# Patient Record
Sex: MALE | Race: WHITE | NOT HISPANIC OR LATINO | Employment: FULL TIME | URBAN - METROPOLITAN AREA
[De-identification: names, ages, dates, MRNs, and addresses within clinical notes are randomized per-mention and may not be internally consistent; named-entity substitution may affect disease eponyms.]

---

## 2021-06-28 ENCOUNTER — LAB (OUTPATIENT)
Dept: LAB | Age: 56
End: 2021-06-28
Payer: COMMERCIAL

## 2021-06-28 DIAGNOSIS — Z79.899 LONG-TERM USE OF HIGH-RISK MEDICATION: ICD-10-CM

## 2021-06-28 LAB
ATRIAL RATE: 50 BPM
P AXIS: 59 DEGREES
PR INTERVAL: 186 MS
QRS AXIS: -29 DEGREES
QRSD INTERVAL: 98 MS
QT INTERVAL: 476 MS
QTC INTERVAL: 433 MS
T WAVE AXIS: 45 DEGREES
VENTRICULAR RATE: 50 BPM

## 2021-06-28 PROCEDURE — 93010 ELECTROCARDIOGRAM REPORT: CPT | Performed by: INTERNAL MEDICINE

## 2021-06-28 PROCEDURE — 93005 ELECTROCARDIOGRAM TRACING: CPT

## 2022-08-11 ENCOUNTER — OFFICE VISIT (OUTPATIENT)
Dept: INTERNAL MEDICINE CLINIC | Facility: CLINIC | Age: 57
End: 2022-08-11
Payer: COMMERCIAL

## 2022-08-11 VITALS
DIASTOLIC BLOOD PRESSURE: 60 MMHG | BODY MASS INDEX: 39.93 KG/M2 | WEIGHT: 269.6 LBS | HEART RATE: 58 BPM | OXYGEN SATURATION: 98 % | HEIGHT: 69 IN | SYSTOLIC BLOOD PRESSURE: 110 MMHG | TEMPERATURE: 98.1 F

## 2022-08-11 DIAGNOSIS — M15.9 PRIMARY OSTEOARTHRITIS INVOLVING MULTIPLE JOINTS: ICD-10-CM

## 2022-08-11 DIAGNOSIS — S22.028D OTHER CLOSED FRACTURE OF SECOND THORACIC VERTEBRA WITH ROUTINE HEALING, SUBSEQUENT ENCOUNTER: ICD-10-CM

## 2022-08-11 DIAGNOSIS — F33.41 RECURRENT MAJOR DEPRESSIVE DISORDER, IN PARTIAL REMISSION (HCC): ICD-10-CM

## 2022-08-11 DIAGNOSIS — R60.0 LOWER EXTREMITY EDEMA: ICD-10-CM

## 2022-08-11 DIAGNOSIS — B18.2 CHRONIC HEPATITIS C WITHOUT HEPATIC COMA (HCC): ICD-10-CM

## 2022-08-11 DIAGNOSIS — J43.2 CENTRILOBULAR EMPHYSEMA (HCC): ICD-10-CM

## 2022-08-11 DIAGNOSIS — M50.30 DDD (DEGENERATIVE DISC DISEASE), CERVICAL: ICD-10-CM

## 2022-08-11 DIAGNOSIS — E55.9 VITAMIN D DEFICIENCY: ICD-10-CM

## 2022-08-11 DIAGNOSIS — Z72.0 TOBACCO USE: ICD-10-CM

## 2022-08-11 DIAGNOSIS — Z13.220 SCREENING CHOLESTEROL LEVEL: ICD-10-CM

## 2022-08-11 DIAGNOSIS — F19.11 HISTORY OF DRUG ABUSE (HCC): ICD-10-CM

## 2022-08-11 DIAGNOSIS — F11.90 OPIOID USE DISORDER: ICD-10-CM

## 2022-08-11 DIAGNOSIS — Z76.89 ESTABLISHING CARE WITH NEW DOCTOR, ENCOUNTER FOR: ICD-10-CM

## 2022-08-11 DIAGNOSIS — K21.00 GASTROESOPHAGEAL REFLUX DISEASE WITH ESOPHAGITIS WITHOUT HEMORRHAGE: ICD-10-CM

## 2022-08-11 DIAGNOSIS — Z12.11 ENCOUNTER FOR COLORECTAL CANCER SCREENING: Primary | ICD-10-CM

## 2022-08-11 DIAGNOSIS — D64.9 ANEMIA, UNSPECIFIED TYPE: ICD-10-CM

## 2022-08-11 DIAGNOSIS — Z12.12 ENCOUNTER FOR COLORECTAL CANCER SCREENING: Primary | ICD-10-CM

## 2022-08-11 DIAGNOSIS — Z96.642 HISTORY OF TOTAL LEFT HIP REPLACEMENT: ICD-10-CM

## 2022-08-11 PROBLEM — F33.9 RECURRENT MAJOR DEPRESSIVE DISORDER (HCC): Status: ACTIVE | Noted: 2022-08-11

## 2022-08-11 PROBLEM — J43.9 PULMONARY EMPHYSEMA (HCC): Status: ACTIVE | Noted: 2022-06-09

## 2022-08-11 PROBLEM — S22.029A CLOSED T2 FRACTURE (HCC): Status: ACTIVE | Noted: 2022-05-04

## 2022-08-11 PROCEDURE — 99204 OFFICE O/P NEW MOD 45 MIN: CPT | Performed by: FAMILY MEDICINE

## 2022-08-11 RX ORDER — ARIPIPRAZOLE 10 MG/1
10 TABLET ORAL DAILY
COMMUNITY
Start: 2022-08-08

## 2022-08-11 RX ORDER — OMEPRAZOLE 20 MG/1
20 CAPSULE, DELAYED RELEASE ORAL DAILY
Qty: 90 CAPSULE | Refills: 1 | Status: SHIPPED | OUTPATIENT
Start: 2022-08-11

## 2022-08-11 RX ORDER — ERGOCALCIFEROL 1.25 MG/1
50000 CAPSULE ORAL WEEKLY
Qty: 12 CAPSULE | Refills: 0 | Status: SHIPPED | OUTPATIENT
Start: 2022-08-11

## 2022-08-11 RX ORDER — OMEPRAZOLE 20 MG/1
20 CAPSULE, DELAYED RELEASE ORAL DAILY
COMMUNITY
Start: 2022-07-01 | End: 2022-08-11 | Stop reason: SDUPTHER

## 2022-08-11 RX ORDER — VENLAFAXINE HYDROCHLORIDE 75 MG/1
75 CAPSULE, EXTENDED RELEASE ORAL DAILY
COMMUNITY
Start: 2022-08-08

## 2022-08-11 NOTE — PROGRESS NOTES
Assessment/Plan:    1  Encounter for colorectal cancer screening  -     Ambulatory referral for colonoscopy; Future    2  Establishing care with new doctor, encounter for    3  DDD (degenerative disc disease), cervical    4  Other closed fracture of second thoracic vertebra with routine healing, subsequent encounter    5  Gastroesophageal reflux disease with esophagitis without hemorrhage  -     Comprehensive metabolic panel; Future  -     omeprazole (PriLOSEC) 20 mg delayed release capsule; Take 1 capsule (20 mg total) by mouth in the morning    6  Chronic hepatitis C without hepatic coma (HCC)    7  History of drug abuse ([place] [address] )    8  Opioid use disorder    9  Primary osteoarthritis involving multiple joints    10  Centrilobular emphysema ([place] [address] )    11  Tobacco use  -     CT lung screening program; Future; Expected date: 08/11/2022    12  History of total left hip replacement    13  Lower extremity edema  -     Echo complete w/ contrast if indicated; Future; Expected date: 08/11/2022  -     VAS lower limb venous duplex study, complete bilateral; Future; Expected date: 08/11/2022    14  Vitamin D deficiency  -     Vitamin D 25 hydroxy; Future  -     ergocalciferol (VITAMIN D2) 50,000 units; Take 1 capsule (50,000 Units total) by mouth once a week    15  Anemia, unspecified type  -     CBC and differential; Future  -     Iron Panel (Includes Ferritin, Iron Sat%, Iron, and TIBC); Future    16  Screening cholesterol level  -     Lipid Panel with Direct LDL reflex; Future    17  Recurrent major depressive disorder, in partial remission ([place] [address] )      BMI Counseling: Body mass index is 40 4 kg/m²  The BMI is above normal  Nutrition recommendations include moderation in carbohydrate intake  Exercise recommendations include exercising 3-5 times per week  No pharmacotherapy was ordered  Rationale for BMI follow-up plan is due to patient being overweight or obese       Depression Screening and Follow-up Plan: Patient's depression screening was positive with a PHQ-2 score of 6  Their PHQ-9 score was 24  several blood tests and other screening test ordered  I do not think he has a lower extremity DVT but we will check a Doppler  If everything is negative we will prescribe water pills  Patient states that he is interested in getting colonoscopy however I advised him to check with his surgeon at [facility] when he is cleared to have a colonoscopy since he just had major spine surgery  He is agreeable to this plan  Check lab work and schedule above-listed tests in a timely fashion  Discussed smoking cessation  Patient will need PFT and likely maintenance inhaler  There are no Patient Instructions on file for this visit  Return in about 4 months (around 12/11/2022) for Recheck  Subjective:      Patient ID: Angelique Warner is a 65 y o  male  Chief Complaint   Patient presents with   [address] [place] [address] Patient Visit     Establish care having leg pain and would like medication filled for acid reflux    Health Screening     Depression screen colonoscopy colonoscopy ordered       HPI     New patient here to our practice, transferred [facility]  Recent history of spine surgery at [facility] 2 months back  He follows with [facility] for his ortho care and has not been cleared to start physical therapy yet according to him  They manage his postop medications  He saw someone at [facility] on July 1st and was ordered some postop labs but he would like to transfer care to [facility]  Patient has hepatitis C that he never had treatment for and his last test reveals 0 viral copies  Patient has postop anemia but did not get his lab work done  He states he was on vitamin-D for vitamin-D deficiency several months back and finished approximately 6 weeks ago  Patient's current complaint is lower extremity edema ongoing for several months but feels that it slightly got worse 1 month ago and now is stabilizing    He has a history of smoking 40 pack years and is not interested in quitting  He also has a history of childhood asthma and working in a cement factory and inhaling dust   Patient states he is taking his medications from Psychiatry and they managed the Abilify and Effexor that he takes for the diagnosis of depression  He also states he is on methadone by the methadone clinic  Patient has a history of drug abuse  When asked what he did he says everything under the sun when I was little  He is accompanied today by his mother  He is asking for refill of his Prilosec which did help him  He has never had a colonoscopy and is due for it  The following portions of the patient's history were reviewed and updated as appropriate: allergies, current medications, past family history, past medical history, past social history, past surgical history and problem list     Review of Systems        Constitutional:  Denies fever or chills   Eyes:  Denies double , blurry vision or eye pain  HENT:  Denies nasal congestion or sore throat   Respiratory:  Denies cough or shortness of breath or wheezing  Cardiovascular:  Denies palpitations or chest pain  GI:  Denies abdominal pain, nausea, or vomiting, no loose stools, no reflux  Integument:  Denies rash , no open areas  Neurologic:  Denies headache or focal weakness, no dizziness  : no dysuria, or hematuria      Current Outpatient Medications   Medication Sig Dispense Refill    ARIPiprazole (ABILIFY) 10 mg tablet Take 10 mg by mouth in the morning      ergocalciferol (VITAMIN D2) 50,000 units Take 1 capsule (50,000 Units total) by mouth once a week 12 capsule 0    omeprazole (PriLOSEC) 20 mg delayed release capsule Take 1 capsule (20 mg total) by mouth in the morning 90 capsule 1    venlafaxine (EFFEXOR-XR) 75 mg 24 hr capsule Take 75 mg by mouth in the morning       No current facility-administered medications for this visit         Objective:    /60 (BP Location: Left arm, Patient Position: Sitting, Cuff Size: Large)   Pulse 58   Temp 98 1 °F (36 7 °C) (Temporal)   Ht 5' 8 5" (1 74 m)   Wt 122 kg (269 lb 9 6 oz)   SpO2 98%   BMI 40 40 kg/m²        Physical Exam       Constitutional:  Well developed, well nourished, no acute distress, non-toxic appearance   Eyes:  PERRL, conjunctiva normal , non icteric sclera  HENT:  Atraumatic, oropharynx moist  Neck-  supple   Respiratory:  CTA b/l, decreased breath sounds at bases bilaterally, no rales, no wheezing   Cardiovascular:  RRR, no murmurs, +1-2 LE edema b/l  GI:  Soft, nondistended, normal bowel sounds x 4, nontender, no organomegaly, no mass, no rebound, no guarding   Neurologic:  no focal deficits noted   Psychiatric:  Speech and behavior appropriate , AAO x 3  Gait is stable with a cane      Kevin Jeong DO

## 2022-10-27 ENCOUNTER — VBI (OUTPATIENT)
Dept: ADMINISTRATIVE | Facility: OTHER | Age: 57
End: 2022-10-27

## 2022-11-25 ENCOUNTER — TELEPHONE (OUTPATIENT)
Dept: INTERNAL MEDICINE CLINIC | Age: 57
End: 2022-11-25

## 2022-11-25 NOTE — TELEPHONE ENCOUNTER
Patient called stating he had back surgery earlier in the year and now received a form that needs to be completed in order for him to keep his life insurance policy  Patient called surgeon's office that did back surgery and they refuse to fill out form due to too much time passing since surgery  Patient stated he was going to send a message on [employer] with forms uploaded to see what needs to be completed on form  Unsure if forms can be completed by Dr Candance Kitchens or if an office appt should be made? Please advise, Thank you

## 2022-11-28 ENCOUNTER — OFFICE VISIT (OUTPATIENT)
Dept: INTERNAL MEDICINE CLINIC | Facility: OTHER | Age: 57
End: 2022-11-28

## 2022-11-28 VITALS
HEART RATE: 90 BPM | TEMPERATURE: 98.7 F | WEIGHT: 274.6 LBS | SYSTOLIC BLOOD PRESSURE: 140 MMHG | DIASTOLIC BLOOD PRESSURE: 88 MMHG | BODY MASS INDEX: 40.67 KG/M2 | HEIGHT: 69 IN | OXYGEN SATURATION: 97 %

## 2022-11-28 DIAGNOSIS — Z98.1 S/P CERVICAL SPINAL FUSION: ICD-10-CM

## 2022-11-28 DIAGNOSIS — Z12.11 ENCOUNTER FOR SCREENING FOR MALIGNANT NEOPLASM OF COLON: Primary | ICD-10-CM

## 2022-11-28 RX ORDER — METHOCARBAMOL 750 MG/1
750 TABLET, FILM COATED ORAL 3 TIMES DAILY PRN
COMMUNITY
Start: 2022-07-01

## 2022-11-28 NOTE — PROGRESS NOTES
Assessment/Plan:    1  Encounter for screening for malignant neoplasm of colon  -     Ambulatory referral for colonoscopy; Future    2  S/P cervical spinal fusion    We will await forms from his specialist to fully complete his current forms  He plans on returning back to their care for surveillance once or twice a year and has an appointment in December however there is a deadline and is forms need to be completed this week and sent in  There are no Patient Instructions on file for this visit  Return for Next scheduled follow up  Subjective:      Patient ID: Marcos Lynch is a 65 y o  male  Chief Complaint   Patient presents with   • Life Insurance Forms   • Hip Pain     Right hip pain & left knee pain  • Leg Swelling     Venous duplex done at [facility] 11/16/2022   •      [place] ordered, pt aware to get colo done       HPI  Patient status post spinal C-spine fusion as well as close T2 fracture in July 2022  Had his care done at [facility] and completed physical therapy  Currently is disabled through them at their request and original forms for due life insurance was filled out by them  He was not able to get an appointment in a timely fashion with them to have his 2nd forms completed and is here with us today  Old records are limited and I do not have his previous form scanned in his chart  He did not bring any copies to me today  Patient states that he is unable to stand or sit for long periods of time and is not able to walk for long periods of time  He has poor range of motion in his neck as well as dysfunction in his hands with hand   He also has some depression because of his physical state and he is compliant with his medications  Currently he is helping to take care of his significant other who had some hospitalizations and is recuperating and is having difficulty doing that  He is not allowed to lift more than 10 lb at a time      The following portions of the patient's history were reviewed and updated as appropriate: allergies, current medications, past family history, past medical history, past social history, past surgical history and problem list     Review of Systems      Constitutional:  Denies fever or chills   Eyes:  Denies double , blurry vision or eye pain  HENT:  Denies nasal congestion or sore throat   Respiratory:  Denies cough or shortness of breath or wheezing  Cardiovascular:  Denies palpitations or chest pain  GI:  Denies abdominal pain, nausea, or vomiting, no loose stools, no reflux  Integument:  Denies rash , no open areas  Neurologic:  Denies headache or focal weakness, no dizziness  : no dysuria, or hematuria      Current Outpatient Medications   Medication Sig Dispense Refill   • ARIPiprazole (ABILIFY) 10 mg tablet Take 10 mg by mouth in the morning     • ergocalciferol (VITAMIN D2) 50,000 units Take 1 capsule (50,000 Units total) by mouth once a week 12 capsule 0   • methocarbamol (ROBAXIN) 750 mg tablet Take 750 mg by mouth Three times daily as needed     • omeprazole (PriLOSEC) 20 mg delayed release capsule Take 1 capsule (20 mg total) by mouth in the morning 90 capsule 1   • venlafaxine (EFFEXOR-XR) 75 mg 24 hr capsule Take 75 mg by mouth in the morning       No current facility-administered medications for this visit         Objective:    /88 (BP Location: Left arm, Patient Position: Sitting, Cuff Size: Large)   Pulse 90   Temp 98 7 °F (37 1 °C) (Temporal)   Ht 5' 8 5" (1 74 m)   Wt 125 kg (274 lb 9 6 oz)   SpO2 97% Comment: ra  BMI 41 15 kg/m²        Physical Exam      Constitutional:  Well developed, well nourished, no acute distress, non-toxic appearance   Eyes:  PERRL, conjunctiva normal , non icteric sclera  HENT:  Atraumatic, oropharynx moist  Neck-  supple   Respiratory:  CTA b/l, normal breath sounds, no rales, no wheezing   Cardiovascular:  RRR, no murmurs, no LE edema b/l  GI:  Soft, nondistended, normal bowel sounds x 4, nontender, no organomegaly, no mass, no rebound, no guarding   Neurologic:  no focal deficits noted   Psychiatric:  Speech and behavior appropriate , AAO x 3  Musculoskeletal, healed surgical scar in C-spine and T-spine  Poor range of motion in neck and T-spine  Poor handgrip bilaterally right worse than left  Poor posture with his C-spine and chain in a more flexed position with slight kyphosis           Domenic Alexander, DO

## 2022-12-19 ENCOUNTER — HOSPITAL ENCOUNTER (OUTPATIENT)
Dept: RADIOLOGY | Facility: MEDICAL CENTER | Age: 57
Discharge: HOME | End: 2022-12-19

## 2022-12-19 DIAGNOSIS — Z72.0 TOBACCO USE: ICD-10-CM

## 2022-12-20 ENCOUNTER — HOSPITAL ENCOUNTER (OUTPATIENT)
Dept: NON INVASIVE DIAGNOSTICS | Facility: CLINIC | Age: 57
Discharge: HOME | End: 2022-12-20

## 2022-12-20 VITALS
SYSTOLIC BLOOD PRESSURE: 140 MMHG | BODY MASS INDEX: 41.52 KG/M2 | HEIGHT: 68 IN | DIASTOLIC BLOOD PRESSURE: 88 MMHG | WEIGHT: 274 LBS | HEART RATE: 90 BPM

## 2022-12-20 DIAGNOSIS — R60.0 LOWER EXTREMITY EDEMA: ICD-10-CM

## 2022-12-20 LAB
AORTIC ROOT: 3.5 CM
APICAL FOUR CHAMBER EJECTION FRACTION: 68 %
ASCENDING AORTA: 3.6 CM
E WAVE DECELERATION TIME: 224 MS
FRACTIONAL SHORTENING: 25 (ref 28–44)
INTERVENTRICULAR SEPTUM IN DIASTOLE (PARASTERNAL SHORT AXIS VIEW): 1.2 CM
INTERVENTRICULAR SEPTUM: 1.2 CM (ref 0.6–1.1)
IVC: 18 MM
LAAS-AP2: 26.3 CM2
LAAS-AP4: 28.1 CM2
LEFT ATRIUM SIZE: 4 CM
LEFT INTERNAL DIMENSION IN SYSTOLE: 3.8 CM (ref 2.1–4)
LEFT VENTRICULAR INTERNAL DIMENSION IN DIASTOLE: 5.1 CM (ref 3.5–6)
LEFT VENTRICULAR POSTERIOR WALL IN END DIASTOLE: 1.2 CM
LEFT VENTRICULAR STROKE VOLUME: 63 ML
LVSV (TEICH): 63 ML
MV E'TISSUE VEL-SEP: 10 CM/S
MV PEAK A VEL: 0.78 M/S
MV PEAK E VEL: 78 CM/S
MV STENOSIS PRESSURE HALF TIME: 65 MS
MV VALVE AREA P 1/2 METHOD: 3.38
RA PRESSURE ESTIMATED: 5 MMHG
RIGHT ATRIAL 2D VOLUME: 54 ML
RIGHT ATRIUM AREA SYSTOLE A4C: 20.2 CM2
RIGHT VENTRICLE ID DIMENSION: 4.8 CM
SL CV LEFT ATRIUM LENGTH A2C: 6.2 CM
SL CV LV EF: 65
SL CV PED ECHO LEFT VENTRICLE DIASTOLIC VOLUME (MOD BIPLANE) 2D: 123 ML
SL CV PED ECHO LEFT VENTRICLE SYSTOLIC VOLUME (MOD BIPLANE) 2D: 60 ML

## 2022-12-20 RX ADMIN — PERFLUTREN 0.4 ML/MIN: 6.52 INJECTION, SUSPENSION INTRAVENOUS at 12:38

## 2023-01-10 ENCOUNTER — CONSULT (OUTPATIENT)
Dept: GASTROENTEROLOGY | Facility: CLINIC | Age: 58
End: 2023-01-10

## 2023-01-10 VITALS
TEMPERATURE: 97.7 F | BODY MASS INDEX: 43.04 KG/M2 | HEIGHT: 68 IN | SYSTOLIC BLOOD PRESSURE: 130 MMHG | WEIGHT: 284 LBS | DIASTOLIC BLOOD PRESSURE: 74 MMHG

## 2023-01-10 DIAGNOSIS — R13.19 OTHER DYSPHAGIA: Primary | ICD-10-CM

## 2023-01-10 DIAGNOSIS — K21.00 GASTROESOPHAGEAL REFLUX DISEASE WITH ESOPHAGITIS WITHOUT HEMORRHAGE: ICD-10-CM

## 2023-01-10 DIAGNOSIS — Z12.11 ENCOUNTER FOR SCREENING FOR MALIGNANT NEOPLASM OF COLON: ICD-10-CM

## 2023-01-10 RX ORDER — OMEPRAZOLE 20 MG/1
20 CAPSULE, DELAYED RELEASE ORAL DAILY
Qty: 90 CAPSULE | Refills: 1 | Status: SHIPPED | OUTPATIENT
Start: 2023-01-10

## 2023-01-10 NOTE — PATIENT INSTRUCTIONS
Scheduled date of colonoscopy/eggd  (as of today):  3/29/23  Physician performing colonoscopy: Dr Wendi Montenegro  Location of colonoscopy: [facility]  Bowel prep reviewed with patient: [email or web address]  Instructions reviewed with patient by: Toni Rodriguez   Clearances:   n/a

## 2023-01-10 NOTE — PROGRESS NOTES
[facility] - Outpatient Consultation  Laura Galaviz 65 y o  male MRN: [id number]  Encounter: [id number]    Assessment and Plan    1  GERD  2  Dysphagia   The patient often experiences epigastric discomfort and burning consistent with reflux  This improves with omeprazole and baking soda  He states if he is not taking the omeprazole his symptoms return  He has undergone upper endoscopy many years ago and was told that he had a "flap" in that area  He states that sometimes he will notice that food gets stuck in his epigastric region  He also states that food gets stuck in his supraclavicular notch however he was told that this was secondary to his spinal disease   -Continue omeprazole 20 mg once daily  -Recommend further evaluation with EGD    3  Colon cancer screening   The patient has yet to undergo colorectal cancer screening  He denies any abnormal bowel habits or family history of colon cancer   -Discussed colonoscopy in detail including the risks of bleeding, infection, bowel perforation, and missed polyp    Follow-up after EGD and colonoscopy    ______________________________________________________________________    History of Present Illness  Laura Galaviz is a 65 y o  male with thoracic spine fracture s/p spinal fusion, DDD, and pulmonary emphysema here for consultation of acid reflux and colon cancer screening  The patient states that currently he has epigastric burning and discomfort consistent with reflux  When he takes omeprazole 20 mg and it resolves his symptoms  If he stops taking the medication his symptoms return  In addition to this he does also admit to dysphagia  He feels as though food gets stuck in his supraclavicular notch as well as his epigastric region  He states that he has had significant spinal issues and has been told this is causes the food to get struck in the supraclavicular area  As for colon cancer screening he is yet to undergo this  At this time he denies any abnormalities with his bowels  No family history of colon cancer to his knowledge  Review of Systems   Constitutional: Negative for activity change, appetite change, chills, fatigue, fever and unexpected weight change  HENT: Positive for trouble swallowing  Gastrointestinal: Negative for abdominal distention, abdominal pain, anal bleeding, blood in stool, constipation, diarrhea, nausea, rectal pain and vomiting  Musculoskeletal: Positive for back pain and gait problem  Psychiatric/Behavioral: Negative for confusion         Past Medical History  Past Medical History:   Diagnosis Date   • Anxiety    • Arthritis    • Cancer ([place] [address] ) 2017    skin cancer   • COPD (chronic obstructive pulmonary disease) ([place] [address] )    • Depression    • Diverticulitis of colon    • GERD (gastroesophageal reflux disease)    • Infectious viral hepatitis    • Pneumonia    • Shingles        Past Social history  Past Surgical History:   Procedure Laterality Date   • APPENDECTOMY     • HIP SURGERY Left 2015   • KNEE SURGERY Left 2020    orthoscopic   • SPINE SURGERY  06/13/2022     Social History     Socioeconomic History   • Marital status: Single     Spouse name: Not on file   • Number of children: Not on file   • Years of education: Not on file   • Highest education level: Not on file   Occupational History   • Not on file   Tobacco Use   • Smoking status: Every Day     Packs/day: 1 00     Years: 40 00     Pack years: 40 00     Types: Cigarettes   • Smokeless tobacco: Former     Types: Chew   Vaping Use   • Vaping Use: Never used   Substance and Sexual Activity   • Alcohol use: Yes     Comment: rare   • Drug use: Not Currently   • Sexual activity: Not Currently   Other Topics Concern   • Not on file   Social History Narrative   • Not on file     Social Determinants of Health     Financial Resource Strain: Not on file   Food Insecurity: Not on file   Transportation Needs: Not on file   Physical Activity: Not on file   Stress: Not on file   Social Connections: Not on file   Intimate Partner Violence: Not on file   Housing Stability: Not on file     Social History     Substance and Sexual Activity   Alcohol Use Yes    Comment: rare     Social History     Substance and Sexual Activity   Drug Use Not Currently     Social History     Tobacco Use   Smoking Status Every Day   • Packs/day: 1 00   • Years: 40 00   • Pack years: 40 00   • Types: Cigarettes   Smokeless Tobacco Former   • Types: Chew       Past Family History  Family History   Problem Relation Age of Onset   • Cancer Mother    • Depression Mother    • Hyperlipidemia Mother    • Thyroid disease Mother    • Cancer Father    • COPD Father    • Hypertension Father    • Post-traumatic stress disorder Father    • Diabetes Sister    • Hepatitis Sister    • Cancer Sister    • Hepatitis Sister    • Mental illness Sister        Current Medications  Current Outpatient Medications   Medication Sig Dispense Refill   • ARIPiprazole (ABILIFY) 10 mg tablet Take 10 mg by mouth in the morning     • bisacodyl (DULCOLAX) 5 mg EC tablet Take 1 tablet (5 mg total) by mouth once for 1 dose 2 tablet 0   • ergocalciferol (VITAMIN D2) 50,000 units Take 1 capsule (50,000 Units total) by mouth once a week 12 capsule 0   • methocarbamol (ROBAXIN) 750 mg tablet Take 750 mg by mouth Three times daily as needed     • omeprazole (PriLOSEC) 20 mg delayed release capsule Take 1 capsule (20 mg total) by mouth in the morning 90 capsule 1   • polyethylene glycol (GOLYTELY) 4000 mL solution Take 4,000 mL by mouth once for 1 dose 4000 mL 0   • venlafaxine (EFFEXOR-XR) 75 mg 24 hr capsule Take 75 mg by mouth in the morning       No current facility-administered medications for this visit         Allergies  Allergies   Allergen Reactions   • Penicillins Anaphylaxis, Rash and Throat Swelling         The following portions of the patient's history were reviewed and updated as appropriate: allergies, current medications, past medical history, past social history, past surgical history and problem list       Vitals  Vitals:    01/10/23 1113   BP: 130/74   BP Location: Left arm   Patient Position: Sitting   Cuff Size: Adult   Temp: 97 7 °F (36 5 °C)   TempSrc: Tympanic   Weight: 129 kg (284 lb)   Height: 5' 8" (1 727 m)         Physical Exam  Constitutional   General appearance: Patient is seated and in no acute distress, well appearing and well nourished  Head and Face   Head and face: Normal     Eyes   Conjunctiva and lids: No erythema, swelling or discharge  Anicteric  Ears, Nose, Mouth, and Throat   Hearing: Normal     Neck: Supple, trachea midline  Pulmonary   Respiratory effort: No increased work of breathing or signs of respiratory distress  Lungs: Clear to ascultation, no wheezes, rhonchi, or rales  Cardiovascular   Heart: Regular rate and rhythm, no murmurs gallops or rubs   Examination of extremities for edema and/or varicosities: Normal     Musculoskeletal   Gait and station: Normal     Skin   Skin and subcutaneous tissue: Warm, dry, and intact  No visible jaundice, lesions or rashes  Psychiatric   Judgment and insight: Normal  Recent and remote memory:  Normal  Mood and affect: Normal      Results  No visits with results within 1 Day(s) from this visit     Latest known visit with results is:   Hospital Outpatient Visit on 12/20/2022   Component Date Value   • A4C EF 12/20/2022 68    • LVIDd 12/20/2022 5 10    • LVIDS 12/20/2022 3 80    • IVSd 12/20/2022 1 20    • LVPWd 12/20/2022 1 20    • FS 12/20/2022 25    • MV E' Tissue Velocity Se* 12/20/2022 10    • E wave deceleration time 12/20/2022 224    • MV Peak E Boyd 12/20/2022 78    • MV Peak A Boyd 12/20/2022 0 78    • RVID d 12/20/2022 4 8    • LA size 12/20/2022 4    • LA length (A2C) 12/20/2022 6 20    • RA 2D Volume 12/20/2022 54 0    • RAA A4C 12/20/2022 20 2    • MV stenosis pressure 1/2* 12/20/2022 65    • MV valve area p 1/2 meth* 12/20/2022 3 38 • Ao root 12/20/2022 3 50    • Asc Ao 12/20/2022 3 6    • Left ventricular stroke * 12/20/2022 63 00    • IVS 12/20/2022 1 2    • LEFT VENTRICLE SYSTOLIC * 20/49/5188 60    • LV DIASTOLIC VOLUME (MOD* 30/34/0310 123    • Left Atrium Area-systoli* 12/20/2022 28 1    • Left Atrium Area-systoli* 12/20/2022 26 3    • LVSV, 2D 12/20/2022 63    • LV EF 12/20/2022 65    • Est  RA pres 12/20/2022 5 0    • IVC 12/20/2022 18 0        Radiology Results  CT lung screening program    Result Date: 12/24/2022  Narrative: CT CHEST LUNG CANCER SCREENING WITHOUT IV CONTRAST INDICATION:   Z72 0: Tobacco use  40 pack year smoking history  COMPARISON: CXR 9/9/2013  Abdomen CT 1/13/2013  TECHNIQUE: CT of the chest without intravenous contrast   Axial, sagittal, coronal 2D reformats and coronal MIPS created from source data  Radiation dose length product (DLP):  376 mGy-cm   Radiation dose exposure minimized using iterative reconstruction and automated exposure control  FINDINGS: LUNGS:  No suspicious nodules  Benign intrapulmonary lymph nodes abutting the minor fissure (3/67, 71)  Multiple benign juxtapleural nodules in the lateral basal left lower lobe, marked on series 3, stable since the abdominal CT from 2013  Benign bilateral linear atelectasis/scar in the mid and lower lungs  Benign calcified granulomas  AIRWAYS: No significant filling defects  PLEURA:  Unremarkable  HEART/GREAT VESSELS:  Normal for age  MEDIASTINUM AND OTF:  Unremarkable  CHEST WALL AND LOWER NECK: Unremarkable  UPPER ABDOMEN:  Diffuse hepatic steatosis  OSSEOUS STRUCTURES: Moderate degenerative disease in the spine  Extensive cervicothoracic fusion  Impression: No suspicious nodules  Lung-RADS1, negative  Continue annual screening with LDCT in 12 months  Diffuse hepatic steatosis    Workstation performed: [id number]     Echo complete w/ contrast if indicated    Result Date: 12/20/2022  Narrative: •  Left Ventricle: Left ventricular cavity size is normal  Wall thickness is mildly increased  There is concentric remodeling  The left ventricular ejection fraction is 65%  Systolic function is normal  Wall motion is normal  Diastolic function is normal  •  Right Ventricle: Right ventricular cavity size is normal  Systolic function is normal  •  Left Atrium: The atrium is mildly dilated (35-41 mL/m2)  •  Right Atrium: The atrium is mildly dilated  Orders  Orders Placed This Encounter   Procedures   • Colonoscopy     Standing Status:   Future     Standing Expiration Date:   1/10/2024     Order Specific Question:   Requested Site     Answer:   [facility]     Order Specific Question:   Performing Location     Answer:   Endoscopy Dept     Order Specific Question:   Anesthesia required? Answer: Yes   • EGD     Standing Status:   Future     Standing Expiration Date:   1/10/2024     Order Specific Question:   Requested Site     Answer:   [facility]     Order Specific Question:   Performing Location     Answer:   Endoscopy Dept     Order Specific Question:   Anesthesia required? Answer:    Yes

## 2023-01-16 ENCOUNTER — OFFICE VISIT (OUTPATIENT)
Dept: INTERNAL MEDICINE CLINIC | Facility: CLINIC | Age: 58
End: 2023-01-16

## 2023-01-16 VITALS
TEMPERATURE: 99.1 F | HEART RATE: 82 BPM | WEIGHT: 282 LBS | DIASTOLIC BLOOD PRESSURE: 80 MMHG | SYSTOLIC BLOOD PRESSURE: 140 MMHG | OXYGEN SATURATION: 96 % | HEIGHT: 67 IN | BODY MASS INDEX: 44.26 KG/M2

## 2023-01-16 DIAGNOSIS — E55.9 VITAMIN D DEFICIENCY: Primary | ICD-10-CM

## 2023-01-16 DIAGNOSIS — M15.9 PRIMARY OSTEOARTHRITIS INVOLVING MULTIPLE JOINTS: ICD-10-CM

## 2023-01-16 DIAGNOSIS — K76.0 FATTY LIVER: ICD-10-CM

## 2023-01-16 DIAGNOSIS — Z12.5 SCREENING PSA (PROSTATE SPECIFIC ANTIGEN): ICD-10-CM

## 2023-01-16 DIAGNOSIS — R60.0 LOWER EXTREMITY EDEMA: ICD-10-CM

## 2023-01-16 DIAGNOSIS — K21.00 GASTROESOPHAGEAL REFLUX DISEASE WITH ESOPHAGITIS WITHOUT HEMORRHAGE: ICD-10-CM

## 2023-01-16 DIAGNOSIS — Z72.0 TOBACCO USE: ICD-10-CM

## 2023-01-16 DIAGNOSIS — J43.2 CENTRILOBULAR EMPHYSEMA (HCC): ICD-10-CM

## 2023-01-16 DIAGNOSIS — E78.1 HYPERTRIGLYCERIDEMIA: ICD-10-CM

## 2023-01-16 RX ORDER — ERGOCALCIFEROL 1.25 MG/1
50000 CAPSULE ORAL WEEKLY
Qty: 12 CAPSULE | Refills: 1 | Status: SHIPPED | OUTPATIENT
Start: 2023-01-16 | End: 2023-01-16 | Stop reason: SDUPTHER

## 2023-01-16 RX ORDER — OMEGA-3-ACID ETHYL ESTERS 1 G/1
2 CAPSULE, LIQUID FILLED ORAL 2 TIMES DAILY
Qty: 60 CAPSULE | Refills: 5 | Status: SHIPPED | OUTPATIENT
Start: 2023-01-16

## 2023-01-16 RX ORDER — ERGOCALCIFEROL 1.25 MG/1
50000 CAPSULE ORAL WEEKLY
Qty: 12 CAPSULE | Refills: 1 | Status: SHIPPED | OUTPATIENT
Start: 2023-01-16

## 2023-01-16 RX ORDER — FUROSEMIDE 20 MG/1
20 TABLET ORAL DAILY
Qty: 30 TABLET | Refills: 5 | Status: SHIPPED | OUTPATIENT
Start: 2023-01-16

## 2023-01-16 NOTE — PROGRESS NOTES
Assessment/Plan:    1  Vitamin D deficiency  -     ergocalciferol (VITAMIN D2) 50,000 units; Take 1 capsule (50,000 Units total) by mouth once a week  -     Vitamin D 25 hydroxy; Future    2  Lower extremity edema  -     furosemide (LASIX) 20 mg tablet; Take 1 tablet (20 mg total) by mouth daily  -     TSH, 3rd generation with Free T4 reflex; Future    3  Centrilobular emphysema ([address] )    4  Tobacco use    5  Fatty liver    6  Gastroesophageal reflux disease with esophagitis without hemorrhage  -     TSH, 3rd generation with Free T4 reflex; Future    7  Primary osteoarthritis involving multiple joints  -     XR knee 3 vw right non injury; Future; Expected date: 01/16/2023  -     XR knee 3 vw left non injury; Future; Expected date: 01/16/2023    8  Hypertriglyceridemia  -     Lipid Panel with Direct LDL reflex; Future; Expected date: 07/16/2023  -     Comprehensive metabolic panel; Future  -     CBC and differential; Future  -     omega-3-acid ethyl esters (LOVAZA) 1 g capsule; Take 2 capsules (2 g total) by mouth 2 (two) times a day    9  Screening PSA (prostate specific antigen)  -     PSA, Total Screen; Future    Discussed better blood pressure control check blood pressure at home and report back  Continue with vitamin supplementation  Repeat blood work 6 months  Start daily Lasix  Advised to make appointment with orthopedics        There are no Patient Instructions on file for this visit  Return in about 6 months (around 7/16/2023) for Recheck  Subjective:      Patient ID: Rafaela Fuchs is a 65 y o  male  Chief Complaint   Patient presents with   • Follow-up     4/ m f/u visit, review labs from 11/30/22, Echo and CT Lung  He is scheduled for a colonoscopy with [facility] on 3/29/23  Declines a flu vaccine  HPI    Lower extremity edema, has bad knees and saw a rheumatology who took off some fluid and placed steroid in there    It did originally feel better but he feels the fluid reaccumulated  Has some concentric remodeling on echocardiogram with ejection fraction 65%,    Significant C-spine surgery with healed surgical scar, has an appointment with his specialist for disability  BMI 44, patient eating significant carbohydrates and not eating much protein      Depression, on Effexor by his psychiatrist   We do not manage this      The following portions of the patient's history were reviewed and updated as appropriate: allergies, current medications, past family history, past medical history, past social history, past surgical history and problem list     Review of Systems      Constitutional:  Denies fever or chills , weight gain  Eyes:  Denies double , blurry vision or eye pain  HENT:  Denies nasal congestion or sore throat   Respiratory:  Denies cough or shortness of breath or wheezing  Cardiovascular:  Denies palpitations or chest pain  GI:  Denies abdominal pain, nausea, or vomiting, no loose stools, no reflux  Integument:  Denies rash , no open areas  Neurologic:  Denies headache or focal weakness, no dizziness  : no dysuria, or hematuria        Current Outpatient Medications   Medication Sig Dispense Refill   • ARIPiprazole (ABILIFY) 10 mg tablet Take 10 mg by mouth in the morning     • ergocalciferol (VITAMIN D2) 50,000 units Take 1 capsule (50,000 Units total) by mouth once a week 12 capsule 1   • furosemide (LASIX) 20 mg tablet Take 1 tablet (20 mg total) by mouth daily 30 tablet 5   • methocarbamol (ROBAXIN) 750 mg tablet Take 750 mg by mouth Three times daily as needed     • omega-3-acid ethyl esters (LOVAZA) 1 g capsule Take 2 capsules (2 g total) by mouth 2 (two) times a day 60 capsule 5   • omeprazole (PriLOSEC) 20 mg delayed release capsule Take 1 capsule (20 mg total) by mouth in the morning 90 capsule 1   • venlafaxine (EFFEXOR-XR) 75 mg 24 hr capsule Take 75 mg by mouth in the morning     • bisacodyl (DULCOLAX) 5 mg EC tablet Take 1 tablet (5 mg total) by mouth once for 1 dose (Patient not taking: Reported on 1/16/2023) 2 tablet 0   • polyethylene glycol (GOLYTELY) 4000 mL solution Take 4,000 mL by mouth once for 1 dose (Patient not taking: Reported on 1/16/2023) 4000 mL 0     No current facility-administered medications for this visit  Objective:    /80 (BP Location: Left arm, Patient Position: Sitting, Cuff Size: Large)   Pulse 82   Temp 99 1 °F (37 3 °C) (Tympanic)   Ht 5' 7" (1 702 m)   Wt 128 kg (282 lb)   SpO2 96%   BMI 44 17 kg/m²        Physical Exam       Constitutional:  Well developed, well nourished, no acute distress, non-toxic appearance   Eyes:  PERRL, conjunctiva normal , non icteric sclera  HENT:  Atraumatic, oropharynx moist  Neck-  supple   Respiratory:  CTA b/l, normal breath sounds, no rales, no wheezing   Cardiovascular:  RRR, no murmurs, trace to +1 LE edema b/l  GI:  Soft, nondistended, normal bowel sounds x 4, nontender, no organomegaly, no mass, no rebound, no guarding   Neurologic:  no focal deficits noted   Psychiatric:  Speech and behavior appropriate , AAO x 3  Musculoskeletal bilateral legs are slightly bowed    Generalized edema at trace to +1  Walks with a cane and is stable    Jorge Lindsey, DO

## 2023-02-17 ENCOUNTER — VBI (OUTPATIENT)
Dept: ADMINISTRATIVE | Facility: OTHER | Age: 58
End: 2023-02-17

## 2023-03-08 ENCOUNTER — TELEPHONE (OUTPATIENT)
Dept: GASTROENTEROLOGY | Facility: CLINIC | Age: 58
End: 2023-03-08

## 2023-03-29 ENCOUNTER — HOSPITAL ENCOUNTER (OUTPATIENT)
Dept: GASTROENTEROLOGY | Facility: HOSPITAL | Age: 58
Setting detail: OUTPATIENT SURGERY
Discharge: HOME | End: 2023-03-29
Attending: INTERNAL MEDICINE

## 2023-03-29 ENCOUNTER — ANESTHESIA EVENT (OUTPATIENT)
Dept: GASTROENTEROLOGY | Facility: HOSPITAL | Age: 58
End: 2023-03-29

## 2023-03-29 ENCOUNTER — ANESTHESIA (OUTPATIENT)
Dept: GASTROENTEROLOGY | Facility: HOSPITAL | Age: 58
End: 2023-03-29

## 2023-03-29 VITALS
SYSTOLIC BLOOD PRESSURE: 99 MMHG | BODY MASS INDEX: 44.26 KG/M2 | TEMPERATURE: 97.5 F | HEART RATE: 73 BPM | DIASTOLIC BLOOD PRESSURE: 47 MMHG | HEIGHT: 67 IN | OXYGEN SATURATION: 98 % | WEIGHT: 282 LBS | RESPIRATION RATE: 16 BRPM

## 2023-03-29 DIAGNOSIS — R13.19 OTHER DYSPHAGIA: ICD-10-CM

## 2023-03-29 DIAGNOSIS — Z12.11 ENCOUNTER FOR SCREENING FOR MALIGNANT NEOPLASM OF COLON: ICD-10-CM

## 2023-03-29 RX ORDER — SODIUM CHLORIDE, SODIUM LACTATE, POTASSIUM CHLORIDE, CALCIUM CHLORIDE 600; 310; 30; 20 MG/100ML; MG/100ML; MG/100ML; MG/100ML
INJECTION, SOLUTION INTRAVENOUS CONTINUOUS PRN
Status: DISCONTINUED | OUTPATIENT
Start: 2023-03-29 | End: 2023-03-29

## 2023-03-29 RX ORDER — EPHEDRINE SULFATE 50 MG/ML
INJECTION INTRAVENOUS AS NEEDED
Status: DISCONTINUED | OUTPATIENT
Start: 2023-03-29 | End: 2023-03-29

## 2023-03-29 RX ORDER — PROPOFOL 10 MG/ML
INJECTION, EMULSION INTRAVENOUS CONTINUOUS PRN
Status: DISCONTINUED | OUTPATIENT
Start: 2023-03-29 | End: 2023-03-29

## 2023-03-29 RX ORDER — LIDOCAINE HYDROCHLORIDE 20 MG/ML
INJECTION, SOLUTION EPIDURAL; INFILTRATION; INTRACAUDAL; PERINEURAL AS NEEDED
Status: DISCONTINUED | OUTPATIENT
Start: 2023-03-29 | End: 2023-03-29

## 2023-03-29 RX ORDER — PROPOFOL 10 MG/ML
INJECTION, EMULSION INTRAVENOUS AS NEEDED
Status: DISCONTINUED | OUTPATIENT
Start: 2023-03-29 | End: 2023-03-29

## 2023-03-29 RX ORDER — SODIUM CHLORIDE 9 MG/ML
INJECTION, SOLUTION INTRAVENOUS CONTINUOUS PRN
Status: DISCONTINUED | OUTPATIENT
Start: 2023-03-29 | End: 2023-03-29

## 2023-03-29 RX ORDER — METHADONE HYDROCHLORIDE 10 MG/5ML
150 SOLUTION ORAL DAILY
COMMUNITY

## 2023-03-29 RX ADMIN — LIDOCAINE HYDROCHLORIDE 100 MG: 20 INJECTION, SOLUTION EPIDURAL; INFILTRATION; INTRACAUDAL; PERINEURAL at 11:36

## 2023-03-29 RX ADMIN — EPHEDRINE SULFATE 10 MG: 50 INJECTION INTRAVENOUS at 12:10

## 2023-03-29 RX ADMIN — SODIUM CHLORIDE: 0.9 INJECTION, SOLUTION INTRAVENOUS at 12:16

## 2023-03-29 RX ADMIN — PROPOFOL 50 MG: 10 INJECTION, EMULSION INTRAVENOUS at 11:50

## 2023-03-29 RX ADMIN — PROPOFOL 100 MG: 10 INJECTION, EMULSION INTRAVENOUS at 11:36

## 2023-03-29 RX ADMIN — PROPOFOL 50 MG: 10 INJECTION, EMULSION INTRAVENOUS at 11:45

## 2023-03-29 RX ADMIN — PROPOFOL 130 MCG/KG/MIN: 10 INJECTION, EMULSION INTRAVENOUS at 12:00

## 2023-03-29 RX ADMIN — EPHEDRINE SULFATE 10 MG: 50 INJECTION INTRAVENOUS at 12:06

## 2023-03-29 RX ADMIN — PROPOFOL 50 MG: 10 INJECTION, EMULSION INTRAVENOUS at 11:38

## 2023-03-29 RX ADMIN — PROPOFOL 50 MG: 10 INJECTION, EMULSION INTRAVENOUS at 11:42

## 2023-03-29 RX ADMIN — SODIUM CHLORIDE: 0.9 INJECTION, SOLUTION INTRAVENOUS at 11:33

## 2023-03-29 NOTE — H&P
History and Physical - [facility]  Dequan Mercedes 65 y o  male MRN: [id number]                  HPI: Dequan Mercedes is a 65y o  year old male who presents for dysphagia and colon cancer screening      REVIEW OF SYSTEMS: Per the HPI, and otherwise unremarkable      Historical Information   Past Medical History:   Diagnosis Date   • Anxiety    • Arthritis    • Cancer ([facility] ) 2017    skin cancer   • COPD (chronic obstructive pulmonary disease) ([facility] )    • Depression    • Diverticulitis of colon    • GERD (gastroesophageal reflux disease)    • Infectious viral hepatitis    • Pneumonia    • Shingles      Past Surgical History:   Procedure Laterality Date   • APPENDECTOMY     • HIP SURGERY Left 2015   • KNEE SURGERY Left 2020    orthoscopic   • SPINE SURGERY  06/13/2022     Social History   Social History     Substance and Sexual Activity   Alcohol Use Yes    Comment: only on Holidays     Social History     Substance and Sexual Activity   Drug Use Not Currently     Social History     Tobacco Use   Smoking Status Every Day   • Packs/day: 0 50   • Years: 40 00   • Pack years: 20 00   • Types: Cigarettes, Pipe   • Start date: 1978   Smokeless Tobacco Former   • Types: Chew     Family History   Problem Relation Age of Onset   • Cancer Mother    • Depression Mother    • Hyperlipidemia Mother    • Thyroid disease Mother    • Cancer Father    • COPD Father    • Hypertension Father    • Post-traumatic stress disorder Father    • Diabetes Sister    • Hepatitis Sister    • Cancer Sister    • Hepatitis Sister    • Mental illness Sister        Meds/Allergies       Current Outpatient Medications:   •  ARIPiprazole (ABILIFY) 10 mg tablet  •  bisacodyl (DULCOLAX) 5 mg EC tablet  •  ergocalciferol (VITAMIN D2) 50,000 units  •  furosemide (LASIX) 20 mg tablet  •  methocarbamol (ROBAXIN) 750 mg tablet  •  omega-3-acid ethyl esters (LOVAZA) 1 g capsule  •  omeprazole (PriLOSEC) 20 mg delayed release capsule  • polyethylene glycol (GOLYTELY) 4000 mL solution  •  venlafaxine (EFFEXOR-XR) 75 mg 24 hr capsule    Allergies   Allergen Reactions   • Penicillins Anaphylaxis, Rash and Throat Swelling       Objective     There were no vitals taken for this visit  PHYSICAL EXAM    Gen: NAD  Head: NCAT  CV: RRR  CHEST: Clear  ABD: soft, NT/ND  EXT: no edema      ASSESSMENT/PLAN:  This is a 65y o  year old male here for EGD and colonoscopy, and he is stable and optimized for his procedure

## 2023-03-29 NOTE — ANESTHESIA PREPROCEDURE EVALUATION
Procedure:  COLONOSCOPY  EGD    Smoker, smoked today  COPD - no inhaler use, used it last week     Methadone - took it today    Relevant Problems   GI/HEPATIC   (+) Fatty liver   (+) Gastroesophageal reflux disease with esophagitis   (+) Hepatitis C virus infection without hepatic coma      MUSCULOSKELETAL   (+) DDD (degenerative disc disease), cervical   (+) Osteoarthritis      NEURO/PSYCH   (+) History of drug abuse (HCC)   (+) Recurrent major depressive disorder (HCC)      PULMONARY   (+) Pulmonary emphysema ([address] )      Echo 12/2022  Left Ventricle Left ventricular cavity size is normal  Wall thickness is mildly increased  There is concentric remodeling  The left ventricular ejection fraction is 65%  Systolic function is normal   Wall motion is normal  Diastolic function is normal    Right Ventricle Right ventricular cavity size is normal  Systolic function is normal    Left Atrium The atrium is mildly dilated (35-41 mL/m2)  Right Atrium The atrium is mildly dilated  Aortic Valve The aortic valve is trileaflet  The leaflets are not thickened  The leaflets are not calcified  The leaflets exhibit normal mobility  There is no evidence of regurgitation  The aortic valve has no significant stenosis  Mitral Valve Mitral valve structure is normal  There is trace regurgitation  There is no evidence of stenosis  Tricuspid Valve Tricuspid valve structure is normal  There is trace regurgitation  There is no evidence of stenosis  There is no indirect evidence of pulmonary hypertension  Pulmonic Valve There is trace regurgitation  There is no evidence of stenosis  Ascending Aorta The aortic root is normal in size  IVC/SVC The inferior vena cava size is 18 0 mm  The right atrial pressure is estimated at 5 0 mmHg  The inferior vena cava is normal in size  Respirophasic changes were normal    Pericardium There is no pericardial effusion  The pericardium is normal in appearance           Physical Exam    Airway    Mallampati score: II  TM Distance: >3 FB  Neck ROM: full     Dental   upper dentures and lower dentures,     Cardiovascular  Rhythm: regular, Rate: normal,     Pulmonary  Breath sounds clear to auscultation,     Other Findings        Anesthesia Plan  ASA Score- 3     Anesthesia Type- IV sedation with anesthesia with ASA Monitors  Additional Monitors:   Airway Plan:           Plan Factors-Exercise tolerance (METS): >4 METS  Chart reviewed  Existing labs reviewed  Patient summary reviewed  Patient is a current smoker  Patient smoked on day of surgery  Obstructive sleep apnea risk education given perioperatively  Induction- intravenous  Postoperative Plan-     Informed Consent- Anesthetic plan and risks discussed with patient  I personally reviewed this patient with the CRNA  Discussed and agreed on the Anesthesia Plan with the CRNA  [place]

## 2023-03-29 NOTE — ANESTHESIA POSTPROCEDURE EVALUATION
Post-Op Assessment Note    CV Status:  Stable    Pain management: adequate     Mental Status:  Alert and awake   Hydration Status:  Euvolemic   PONV Controlled:  Controlled   Airway Patency:  Patent      Post Op Vitals Reviewed: Yes      Staff: CRNA         No notable events documented      BP   113/72   Temp   97 2   Pulse  57   Resp   16   SpO2   99%

## 2023-05-09 ENCOUNTER — VBI (OUTPATIENT)
Dept: ADMINISTRATIVE | Facility: OTHER | Age: 58
End: 2023-05-09

## 2023-07-17 ENCOUNTER — OFFICE VISIT (OUTPATIENT)
Dept: INTERNAL MEDICINE CLINIC | Facility: OTHER | Age: 58
End: 2023-07-17
Payer: COMMERCIAL

## 2023-07-17 VITALS
HEART RATE: 83 BPM | WEIGHT: 283.4 LBS | RESPIRATION RATE: 18 BRPM | BODY MASS INDEX: 44.48 KG/M2 | HEIGHT: 67 IN | OXYGEN SATURATION: 96 % | TEMPERATURE: 98.7 F | SYSTOLIC BLOOD PRESSURE: 148 MMHG | DIASTOLIC BLOOD PRESSURE: 82 MMHG

## 2023-07-17 DIAGNOSIS — J43.2 CENTRILOBULAR EMPHYSEMA (HCC): ICD-10-CM

## 2023-07-17 DIAGNOSIS — K21.00 GASTROESOPHAGEAL REFLUX DISEASE WITH ESOPHAGITIS WITHOUT HEMORRHAGE: ICD-10-CM

## 2023-07-17 DIAGNOSIS — R60.0 LOWER EXTREMITY EDEMA: ICD-10-CM

## 2023-07-17 DIAGNOSIS — S22.028S: ICD-10-CM

## 2023-07-17 DIAGNOSIS — Z12.2 SCREENING FOR LUNG CANCER: Primary | ICD-10-CM

## 2023-07-17 DIAGNOSIS — B35.1 ONYCHOMYCOSIS: ICD-10-CM

## 2023-07-17 DIAGNOSIS — M15.9 PRIMARY OSTEOARTHRITIS INVOLVING MULTIPLE JOINTS: ICD-10-CM

## 2023-07-17 DIAGNOSIS — Z72.0 TOBACCO USE: ICD-10-CM

## 2023-07-17 DIAGNOSIS — E78.1 HYPERTRIGLYCERIDEMIA: ICD-10-CM

## 2023-07-17 DIAGNOSIS — F33.41 RECURRENT MAJOR DEPRESSIVE DISORDER, IN PARTIAL REMISSION (HCC): ICD-10-CM

## 2023-07-17 DIAGNOSIS — Z98.1 S/P CERVICAL SPINAL FUSION: ICD-10-CM

## 2023-07-17 DIAGNOSIS — E55.9 VITAMIN D DEFICIENCY: ICD-10-CM

## 2023-07-17 PROBLEM — Z76.89 ESTABLISHING CARE WITH NEW DOCTOR, ENCOUNTER FOR: Status: RESOLVED | Noted: 2022-08-11 | Resolved: 2023-07-17

## 2023-07-17 PROBLEM — M43.22 CERVICAL VERTEBRAL FUSION: Status: ACTIVE | Noted: 2023-07-17

## 2023-07-17 PROBLEM — S22.029A CLOSED T2 FRACTURE (HCC): Status: RESOLVED | Noted: 2022-05-04 | Resolved: 2023-07-17

## 2023-07-17 PROCEDURE — 99214 OFFICE O/P EST MOD 30 MIN: CPT | Performed by: FAMILY MEDICINE

## 2023-07-17 RX ORDER — OMEGA-3-ACID ETHYL ESTERS 1 G/1
2 CAPSULE, LIQUID FILLED ORAL 2 TIMES DAILY
Qty: 60 CAPSULE | Refills: 5 | Status: SHIPPED | OUTPATIENT
Start: 2023-07-17

## 2023-07-17 RX ORDER — ADALIMUMAB 40MG/0.4ML
40 KIT SUBCUTANEOUS
COMMUNITY
Start: 2023-04-27

## 2023-07-17 RX ORDER — FUROSEMIDE 20 MG/1
20 TABLET ORAL DAILY
Qty: 30 TABLET | Refills: 5 | Status: SHIPPED | OUTPATIENT
Start: 2023-07-17

## 2023-07-17 RX ORDER — METHOCARBAMOL 750 MG/1
750 TABLET, FILM COATED ORAL EVERY 6 HOURS PRN
Qty: 90 TABLET | Refills: 1 | Status: SHIPPED | OUTPATIENT
Start: 2023-07-17

## 2023-07-17 RX ORDER — OMEPRAZOLE 20 MG/1
20 CAPSULE, DELAYED RELEASE ORAL DAILY
Qty: 90 CAPSULE | Refills: 1 | Status: SHIPPED | OUTPATIENT
Start: 2023-07-17

## 2023-07-17 RX ORDER — ERGOCALCIFEROL 1.25 MG/1
50000 CAPSULE ORAL WEEKLY
Qty: 12 CAPSULE | Refills: 1 | Status: SHIPPED | OUTPATIENT
Start: 2023-07-17

## 2023-07-17 NOTE — PROGRESS NOTES
Assessment/Plan:    1. Screening for lung cancer  -     CT lung screening program; Future; Expected date: 07/17/2023    2. Gastroesophageal reflux disease with esophagitis without hemorrhage  -     omeprazole (PriLOSEC) 20 mg delayed release capsule; Take 1 capsule (20 mg total) by mouth in the morning    3. Lower extremity edema  -     furosemide (LASIX) 20 mg tablet; Take 1 tablet (20 mg total) by mouth daily    4. Hypertriglyceridemia  -     omega-3-acid ethyl esters (LOVAZA) 1 g capsule; Take 2 capsules (2 g total) by mouth 2 (two) times a day    5. Vitamin D deficiency  -     ergocalciferol (VITAMIN D2) 50,000 units; Take 1 capsule (50,000 Units total) by mouth once a week    6. Tobacco use    7. Centrilobular emphysema ([address])    8. Primary osteoarthritis involving multiple joints    9. S/P cervical spinal fusion  -     methocarbamol (ROBAXIN) 750 mg tablet; Take 1 tablet (750 mg total) by mouth every 6 (six) hours as needed for muscle spasms  -     DXA bone density spine hip and pelvis; Future; Expected date: 07/17/2023    10. Recurrent major depressive disorder, in partial remission ([address])    11. Other closed fracture of second thoracic vertebra, sequela  -     methocarbamol (ROBAXIN) 750 mg tablet; Take 1 tablet (750 mg total) by mouth every 6 (six) hours as needed for muscle spasms  -     DXA bone density spine hip and pelvis; Future; Expected date: 07/17/2023    12. Onychomycosis  -     Ambulatory Referral to Podiatry; Future      BMI Counseling: Body mass index is 44.39 kg/m². The BMI is above normal. Nutrition recommendations include moderation in carbohydrate intake. Exercise recommendations include exercising 3-5 times per week. No pharmacotherapy was ordered. Rationale for BMI follow-up plan is due to patient being overweight or obese. There are no Patient Instructions on file for this visit. Return in about 6 months (around 1/17/2024) for Recheck.     Subjective:      Patient ID: Shae Piña Lucio Heard is a 65 y.o. male. Chief Complaint   Patient presents with   • Follow-up     6 month, had labs done in feb   • hm     Bmi f/u, annual, lung ca screening   • referral podiatry      Skin peeling and toe fungus   • disability     Needs disability but surgeon wont sign for it when they told him he cant work any more       HPI       Lower extremity edema, has bad knees and saw a rheumatology who took off some fluid and placed steroid in there. It did originally feel better but he feels the fluid reaccumulated. July 2023, still with lower extremity edema but not worse, gets worse throughout the day   Has some concentric remodeling on echocardiogram with ejection fraction 65%,     Significant C-spine surgery with healed surgical scar, has an appointment with his specialist for disability.   July 2023, stable     BMI 44, patient eating significant carbohydrates and not eating much protein.   July 2023, has not been able to lose weight      Depression, on Effexor by his psychiatrist.  We do not manage this        The following portions of the patient's history were reviewed and updated as appropriate: allergies, current medications, past family history, past medical history, past social history, past surgical history and problem list.    Review of Systems      Constitutional:  Denies fever or chills   Eyes:  Denies double , blurry vision or eye pain  HENT:  Denies nasal congestion, sore throat or new hearing issues  Respiratory:  Denies cough or shortness of breath or wheezing  Cardiovascular:  Denies palpitations or chest pain  GI:  Denies abdominal pain, nausea, or vomiting, no loose stools, no reflux  Integument:  Denies rash , no open areas  Neurologic:  Denies headache or focal weakness, no dizziness  : no dysuria, or hematuria      Current Outpatient Medications   Medication Sig Dispense Refill   • Adalimumab (Humira Pen) 40 MG/0.4ML PNKT Inject 40 mg under the skin every 14 (fourteen) days     • ARIPiprazole (ABILIFY) 10 mg tablet Take 10 mg by mouth in the morning     • ergocalciferol (VITAMIN D2) 50,000 units Take 1 capsule (50,000 Units total) by mouth once a week 12 capsule 1   • furosemide (LASIX) 20 mg tablet Take 1 tablet (20 mg total) by mouth daily 30 tablet 5   • methadone (DOLOPHINE) 10 MG/5ML solution Take 150 mg by mouth daily     • methocarbamol (ROBAXIN) 750 mg tablet Take 1 tablet (750 mg total) by mouth every 6 (six) hours as needed for muscle spasms 90 tablet 1   • omega-3-acid ethyl esters (LOVAZA) 1 g capsule Take 2 capsules (2 g total) by mouth 2 (two) times a day 60 capsule 5   • omeprazole (PriLOSEC) 20 mg delayed release capsule Take 1 capsule (20 mg total) by mouth in the morning 90 capsule 1   • venlafaxine (EFFEXOR-XR) 75 mg 24 hr capsule Take 75 mg by mouth in the morning     • bisacodyl (DULCOLAX) 5 mg EC tablet Take 1 tablet (5 mg total) by mouth once for 1 dose (Patient not taking: Reported on 1/16/2023) 2 tablet 0   • polyethylene glycol (GOLYTELY) 4000 mL solution Take 4,000 mL by mouth once for 1 dose (Patient not taking: Reported on 1/16/2023) 4000 mL 0     No current facility-administered medications for this visit.        Objective:    /82 (BP Location: Left arm, Patient Position: Sitting, Cuff Size: Large)   Pulse 83   Temp 98.7 °F (37.1 °C) (Temporal)   Resp 18   Ht 5' 7" (1.702 m)   Wt 129 kg (283 lb 6.4 oz)   SpO2 96%   BMI 44.39 kg/m²        Physical Exam       Constitutional:  Well developed, well nourished, no acute distress, non-toxic appearance   Eyes:  PERRL, conjunctiva normal , non icteric sclera  HENT:  Atraumatic, oropharynx moist. Neck-  supple   Respiratory:  CTA b/l, normal breath sounds, no rales, no wheezing   Cardiovascular:  RRR, no murmurs, no LE edema b/l  GI:  Soft, nondistended, normal bowel sounds x 4, nontender, no organomegaly, no mass, no rebound, no guarding   Neurologic:  no focal deficits noted   Psychiatric:  Speech and behavior appropriate , AAO x [address],

## 2023-08-15 ENCOUNTER — OFFICE VISIT (OUTPATIENT)
Dept: URGENT CARE | Age: 58
End: 2023-08-15
Payer: COMMERCIAL

## 2023-08-15 VITALS
DIASTOLIC BLOOD PRESSURE: 64 MMHG | HEART RATE: 76 BPM | OXYGEN SATURATION: 96 % | WEIGHT: 283 LBS | RESPIRATION RATE: 18 BRPM | SYSTOLIC BLOOD PRESSURE: 163 MMHG | TEMPERATURE: 97.5 F | BODY MASS INDEX: 44.32 KG/M2

## 2023-08-15 DIAGNOSIS — T14.8XXA WOUND INFECTION: Primary | ICD-10-CM

## 2023-08-15 DIAGNOSIS — L08.9 WOUND INFECTION: Primary | ICD-10-CM

## 2023-08-15 PROCEDURE — 99213 OFFICE O/P EST LOW 20 MIN: CPT

## 2023-08-15 RX ORDER — SULFAMETHOXAZOLE AND TRIMETHOPRIM 800; 160 MG/1; MG/1
1 TABLET ORAL EVERY 12 HOURS SCHEDULED
Qty: 14 TABLET | Refills: 0 | Status: SHIPPED | OUTPATIENT
Start: 2023-08-15 | End: 2023-08-22

## 2023-08-15 NOTE — PATIENT INSTRUCTIONS
Please follow the following wound care instructions daily:  1) remove old dressing   2) cleanse wound bed with normal saline   3) use Xeroform to cover wound bed   4) place non-adherent gauze over Xeroform   5) wrap with gauze wrap   Please take Bactrim 2x daily for 7 days. Referral to Wound Care.

## 2023-08-15 NOTE — PROGRESS NOTES
[facility]        NAME: Katiana Hester is a 63 y.o. male  : 1965    MRN: [id number]  DATE: August 15, 2023  TIME: 4:03 PM    Assessment and Plan   Wound infection [T14. 8XXA, L08.9]  1. Wound infection  sulfamethoxazole-trimethoprim (BACTRIM DS) 800-160 mg per tablet    Ambulatory Referral to Wound Care        Wound was cleansed with wound cleansing solution. Wound was debrided with forceps. Small pieces of Xeroform were used to cover wound bed. The wound was then dressed with a non-adherent gauze dressing and cling. Patient tolerated well with no immediate complication. Patient Instructions     Please follow the following wound care instructions daily:  1) remove old dressing   2) cleanse wound bed with normal saline   3) use Xeroform to cover wound bed   4) place non-adherent gauze over Xeroform   5) wrap with gauze wrap   Please take Bactrim 2x daily for 7 days. Referral to Wound Care. Follow up with PCP in 3-5 days. Proceed to  ER if symptoms worsen. Chief Complaint     Chief Complaint   Patient presents with   • Leg Injury     Patient states he fell down on concrete steps causing an aberrations  both legs. Now they are swollen , red and it's some discharge   it happen Saturday. History of Present Illness       Patient presenting for evaluation of bilateral lower extremity wounds. Patient states that he fell on a concrete step 5 days ago. He states that 1 day after falling his mom dressed the wounds with Xeroform and gauze. He states that he has not changed the dressing since. He noticed that the gauze has been covered in a foul-smelling, purulent drainage. He denies any fevers or chills, but states that the area is red and warm to touch. He also states that his bilateral lower extremities are painful. Review of Systems   Review of Systems   Constitutional: Negative for chills and fever. Respiratory: Negative for shortness of breath.     Cardiovascular: Positive for leg swelling. Negative for chest pain. Skin: Positive for color change and wound. All other systems reviewed and are negative.         Current Medications       Current Outpatient Medications:   •  sulfamethoxazole-trimethoprim (BACTRIM DS) 800-160 mg per tablet, Take 1 tablet by mouth every 12 (twelve) hours for 7 days, Disp: 14 tablet, Rfl: 0  •  Adalimumab (Humira Pen) 40 MG/0.4ML PNKT, Inject 40 mg under the skin every 14 (fourteen) days, Disp: , Rfl:   •  ARIPiprazole (ABILIFY) 10 mg tablet, Take 10 mg by mouth in the morning, Disp: , Rfl:   •  bisacodyl (DULCOLAX) 5 mg EC tablet, Take 1 tablet (5 mg total) by mouth once for 1 dose (Patient not taking: Reported on 1/16/2023), Disp: 2 tablet, Rfl: 0  •  ergocalciferol (VITAMIN D2) 50,000 units, Take 1 capsule (50,000 Units total) by mouth once a week, Disp: 12 capsule, Rfl: 1  •  furosemide (LASIX) 20 mg tablet, Take 1 tablet (20 mg total) by mouth daily, Disp: 30 tablet, Rfl: 5  •  methadone (DOLOPHINE) 10 MG/5ML solution, Take 150 mg by mouth daily, Disp: , Rfl:   •  methocarbamol (ROBAXIN) 750 mg tablet, Take 1 tablet (750 mg total) by mouth every 6 (six) hours as needed for muscle spasms, Disp: 90 tablet, Rfl: 1  •  omega-3-acid ethyl esters (LOVAZA) 1 g capsule, Take 2 capsules (2 g total) by mouth 2 (two) times a day, Disp: 60 capsule, Rfl: 5  •  omeprazole (PriLOSEC) 20 mg delayed release capsule, Take 1 capsule (20 mg total) by mouth in the morning, Disp: 90 capsule, Rfl: 1  •  polyethylene glycol (GOLYTELY) 4000 mL solution, Take 4,000 mL by mouth once for 1 dose (Patient not taking: Reported on 1/16/2023), Disp: 4000 mL, Rfl: 0  •  venlafaxine (EFFEXOR-XR) 75 mg 24 hr capsule, Take 75 mg by mouth in the morning, Disp: , Rfl:     Current Allergies     Allergies as of 08/15/2023 - Reviewed 08/15/2023   Allergen Reaction Noted   • Penicillins Anaphylaxis, Rash, and Throat Swelling 06/19/2003            The following portions of the patient's history were reviewed and updated as appropriate: allergies, current medications, past family history, past medical history, past social history, past surgical history and problem list.     Past Medical History:   Diagnosis Date   • Anxiety    • Arthritis    • Cancer ([address]) 2017    skin cancer   • Closed T2 fracture ([address]) 5/4/2022   • COPD (chronic obstructive pulmonary disease) ([address])    • Depression    • Diverticulitis of colon    • Establishing care with new doctor, encounter for 8/11/2022   • GERD (gastroesophageal reflux disease)    • Infectious viral hepatitis    • Pneumonia    • Shingles        Past Surgical History:   Procedure Laterality Date   • APPENDECTOMY     • HIP SURGERY Left 2015   • KNEE SURGERY Left 2020    orthoscopic   • SPINE SURGERY  06/13/2022       Family History   Problem Relation Age of Onset   • Cancer Mother    • Depression Mother    • Hyperlipidemia Mother    • Thyroid disease Mother    • Cancer Father    • COPD Father    • Hypertension Father    • Post-traumatic stress disorder Father    • Diabetes Sister    • Hepatitis Sister    • Cancer Sister    • Hepatitis Sister    • Mental illness Sister          Medications have been verified. Objective   /64   Pulse 76   Temp 97.5 °F (36.4 °C) (Temporal)   Resp 18   Wt 128 kg (283 lb)   SpO2 96%   BMI 44.32 kg/m²        Physical Exam     Physical Exam  Vitals and nursing note reviewed. Constitutional:       General: He is not in acute distress. Appearance: Normal appearance. He is not ill-appearing, toxic-appearing or diaphoretic. HENT:      Head: Normocephalic and atraumatic. Eyes:      General:         Right eye: No discharge. Left eye: No discharge. Cardiovascular:      Rate and Rhythm: Normal rate. Pulses: Normal pulses. Pulmonary:      Effort: Pulmonary effort is normal.   Musculoskeletal:      Right lower leg: Edema present. Left lower leg: Edema present.    Skin:     General: Skin is warm and dry. Findings: Erythema present. Comments: Skin tears, small amount of purulent drainage, no bleeding, generalized edema and erythema to b/l lower extremity, area is warm to touch   Neurological:      Mental Status: He is alert.    Psychiatric:         Mood and Affect: Mood normal.         Behavior: Behavior normal.

## 2023-08-23 ENCOUNTER — OFFICE VISIT (OUTPATIENT)
Dept: WOUND CARE | Facility: CLINIC | Age: 58
End: 2023-08-23
Payer: COMMERCIAL

## 2023-08-23 VITALS
SYSTOLIC BLOOD PRESSURE: 136 MMHG | WEIGHT: 280 LBS | RESPIRATION RATE: 20 BRPM | DIASTOLIC BLOOD PRESSURE: 93 MMHG | HEIGHT: 67 IN | BODY MASS INDEX: 43.95 KG/M2 | HEART RATE: 79 BPM | TEMPERATURE: 97 F

## 2023-08-23 DIAGNOSIS — S81.802A OPEN WOUND OF BOTH LOWER EXTREMITIES, INITIAL ENCOUNTER: Primary | ICD-10-CM

## 2023-08-23 DIAGNOSIS — Z72.0 TOBACCO USE: ICD-10-CM

## 2023-08-23 DIAGNOSIS — S81.801A OPEN WOUND OF BOTH LOWER EXTREMITIES, INITIAL ENCOUNTER: Primary | ICD-10-CM

## 2023-08-23 DIAGNOSIS — R60.9 PERIPHERAL EDEMA: ICD-10-CM

## 2023-08-23 PROCEDURE — 99213 OFFICE O/P EST LOW 20 MIN: CPT | Performed by: STUDENT IN AN ORGANIZED HEALTH CARE EDUCATION/TRAINING PROGRAM

## 2023-08-23 PROCEDURE — 97597 DBRDMT OPN WND 1ST 20 CM/<: CPT | Performed by: STUDENT IN AN ORGANIZED HEALTH CARE EDUCATION/TRAINING PROGRAM

## 2023-08-23 PROCEDURE — 99204 OFFICE O/P NEW MOD 45 MIN: CPT | Performed by: STUDENT IN AN ORGANIZED HEALTH CARE EDUCATION/TRAINING PROGRAM

## 2023-08-23 RX ORDER — LIDOCAINE 40 MG/G
CREAM TOPICAL ONCE
Status: COMPLETED | OUTPATIENT
Start: 2023-08-23 | End: 2023-08-23

## 2023-08-23 RX ADMIN — LIDOCAINE: 40 CREAM TOPICAL at 13:28

## 2023-08-23 NOTE — PATIENT INSTRUCTIONS
Orders Placed This Encounter   Procedures    Wound cleansing and dressings     Wash your hands with soap and water. Remove old dressing, discard into plastic bag and place in trash. Cleanse right and left lower legs and wounds with soap and water (Dove for sensitive skin). prior to applying a clean dressing. Do not use tissue or cotton balls. Do not scrub the wound. Pat dry using gauze. Apply Dermagran gauze to the right and left lower leg wounds. Cover with gauze/abdominal sponge, then secure with nelda and tape. Change dressing every day and as needed for excessive drainage or leakage. This was done today. Elastic Tubular Stocking-F    Apply from base of toes to behind the knee. Apply every morning, may remove for sleep. Avoid prolonged standing in one place. Elevate leg(s) above the level of the heart when sitting or as much as possible. Shower yes     Please stop or decrease smoking. and Increase the dietary protein in your diet. Please call the [phone] Monday through Friday between the hours of 8:00 AM and 4:30 PM at [phone] if you have any questions, if you experience any major changes in your wound(s) or for any signs or symptoms of infection such as fever; changes in the redness, swelling, drainage, or odor of your wound. After hours, weekends or holidays please contact your primary care physician or go to the hospital emergency room.       Follow up in one week     Standing Status:   Future     Standing Expiration Date:   8/23/2024

## 2023-08-23 NOTE — PROGRESS NOTES
Patient ID: Kurt Haq is a 63 y.o. male Date of Birth 1965       Chief Complaint   Patient presents with   • New Patient Visit     Patient here today for evaluation on traumatic wounds to lower legs. Patient stated he was wearing work pants when he fell up the concrete steps. Patient stated he completed his antibiotics. Allergies:  Penicillins    Diagnosis:   Diagnosis ICD-10-CM Associated Orders   1. Open wound of both lower extremities, initial encounter  S81.801A lidocaine (LMX) 4 % cream    S81.802A Wound cleansing and dressings     Debridement     Debridement      2. Tobacco use  Z72.0       3. Peripheral edema  R60.9            Assessment  & Plan:    • Initial evaluation of bilateral traumatic abrasions of lower extremities. Wounds are partial-thickness with both soft and dried exudate within the wound beds. Relation tissue is present underlying exudate. There is drainage of small. There is no periwound maceration. No surrounding erythema to suggest persistent infection. Significant swelling of bilateral lower extremities is present.  o Selective debridement, as below. o [place] to all open wounds. Keep covered at all times. Change daily. o [address] for compression. 2+ DP and PT pulses bilaterally. If toes become painful numb change colors compression should be removed and legs elevated. o Continue with leg elevation for peripheral edema control and continue with diuretic as prescribed. o Obtain 3-4 servings of protein daily for wound healing.  o Discussed importance of reduction in cigarette usage for wound healing. o Shower and cleanse wounds with gentle soap and water. Avoid use of hydrogen peroxide. Do not soak wounds in any standing body of water. Avoid lakes, oceans, tubs. o Instructed to monitor for any changes including redness or swelling surrounding the wound, increased drainage or pain as well as fevers or chills.    o F/u in one week.  Instructed to call if any questions or concerns arise in meantime. Subjective:   08/23/23: 62 y/o M with PMHx of peripheral edema, emphysema, hepatitis C, spinal fracture, tobacco use presents for evaluation of abrasions on bilateral anterior lower extremities. In early August the patient fell on concrete and sustained abrasions to the anterior aspects of both of his legs. He later presented to urgent care on 08/15/23 for a wound check d/t foul-smelling purulent drainage from his wounds and redness of his lower legs. Care recommended the patient use Xeroform to the site of both wounds and initiated the patient on a course of Bactrim. On evaluation today patient states his mother is assisting him with dressing changes. He has finished his course of Bactrim as of this morning and states the pain at the sites of his wounds are beginning to improve. Does note his legs become swollen. He is taking a diuretic and elevates his legs when sleeping at night. Does obtain as much protein in his diet as he used to. Currently smokes. Denies a history of diabetes.         The following portions of the patient's history were reviewed and updated as appropriate:   Patient Active Problem List   Diagnosis   • Pulmonary emphysema ([address])   • DDD (degenerative disc disease), cervical   • Gastroesophageal reflux disease with esophagitis   • Hepatitis C virus infection without hepatic coma   • History of drug abuse ([address])   • Opioid use disorder   • Osteoarthritis   • Tobacco use   • History of total left hip replacement   • Lower extremity edema   • Recurrent major depressive disorder ([address])   • S/P cervical spinal fusion   • Fatty liver   • Cervical vertebral fusion     Past Medical History:   Diagnosis Date   • Anxiety    • Arthritis    • Cancer ([address]) 2017    skin cancer   • Closed T2 fracture ([address]) 5/4/2022   • COPD (chronic obstructive pulmonary disease) ([address])    • Depression    • Diverticulitis of colon    • Establishing care with new doctor, encounter for 8/11/2022   • GERD (gastroesophageal reflux disease)    • Infectious viral hepatitis    • Pneumonia    • Shingles      Past Surgical History:   Procedure Laterality Date   • APPENDECTOMY     • HIP SURGERY Left 2015   • KNEE SURGERY Left 2020    orthoscopic   • SPINE SURGERY  06/13/2022     Family History   Problem Relation Age of Onset   • Cancer Mother    • Depression Mother    • Hyperlipidemia Mother    • Thyroid disease Mother    • Cancer Father    • COPD Father    • Hypertension Father    • Post-traumatic stress disorder Father    • Diabetes Sister    • Hepatitis Sister    • Cancer Sister    • Hepatitis Sister    • Mental illness Sister      Social History     Socioeconomic History   • Marital status: Single     Spouse name: Not on file   • Number of children: Not on file   • Years of education: Not on file   • Highest education level: Not on file   Occupational History   • Not on file   Tobacco Use   • Smoking status: Every Day     Packs/day: 0.50     Years: 40.00     Total pack years: 20.00     Types: Cigarettes, Pipe     Start date: 1978   • Smokeless tobacco: Former     Types: Chew   Vaping Use   • Vaping Use: Never used   Substance and Sexual Activity   • Alcohol use: Yes     Comment: only on Holidays   • Drug use: Not Currently   • Sexual activity: Not Currently   Other Topics Concern   • Not on file   Social History Narrative   • Not on file     Social Determinants of Health     Financial Resource Strain: Not on file   Food Insecurity: Not on file   Transportation Needs: Not on file   Physical Activity: Not on file   Stress: Not on file   Social Connections: Not on file   Intimate Partner Violence: Not on file   Housing Stability: Not on file       Current Outpatient Medications:   •  Adalimumab (Humira Pen) 40 MG/0.4ML PNKT, Inject 40 mg under the skin every 14 (fourteen) days, Disp: , Rfl:   •  ARIPiprazole (ABILIFY) 10 mg tablet, Take 10 mg by mouth in the morning, Disp: , Rfl: •  bisacodyl (DULCOLAX) 5 mg EC tablet, Take 1 tablet (5 mg total) by mouth once for 1 dose (Patient not taking: Reported on 1/16/2023), Disp: 2 tablet, Rfl: 0  •  ergocalciferol (VITAMIN D2) 50,000 units, Take 1 capsule (50,000 Units total) by mouth once a week, Disp: 12 capsule, Rfl: 1  •  furosemide (LASIX) 20 mg tablet, Take 1 tablet (20 mg total) by mouth daily, Disp: 30 tablet, Rfl: 5  •  methadone (DOLOPHINE) 10 MG/5ML solution, Take 150 mg by mouth daily, Disp: , Rfl:   •  methocarbamol (ROBAXIN) 750 mg tablet, Take 1 tablet (750 mg total) by mouth every 6 (six) hours as needed for muscle spasms, Disp: 90 tablet, Rfl: 1  •  omega-3-acid ethyl esters (LOVAZA) 1 g capsule, Take 2 capsules (2 g total) by mouth 2 (two) times a day, Disp: 60 capsule, Rfl: 5  •  omeprazole (PriLOSEC) 20 mg delayed release capsule, Take 1 capsule (20 mg total) by mouth in the morning, Disp: 90 capsule, Rfl: 1  •  polyethylene glycol (GOLYTELY) 4000 mL solution, Take 4,000 mL by mouth once for 1 dose (Patient not taking: Reported on 1/16/2023), Disp: 4000 mL, Rfl: 0  •  venlafaxine (EFFEXOR-XR) 75 mg 24 hr capsule, Take 75 mg by mouth in the morning, Disp: , Rfl:   No current facility-administered medications for this visit. Review of Systems   Constitutional: Negative for chills and fever. Cardiovascular: Positive for leg swelling. Musculoskeletal: Positive for arthralgias. Skin: Positive for wound (bilateral lower legs). Negative for color change. Psychiatric/Behavioral: Negative for agitation and confusion. Objective:  /93   Pulse 79   Temp (!) 97 °F (36.1 °C)   Resp 20   Ht 5' 7" (1.702 m)   Wt 127 kg (280 lb)   BMI 43.85 kg/m²   Pain Score:   8     Physical Exam  Vitals reviewed. Constitutional:       General: He is not in acute distress. Appearance: He is obese. Cardiovascular:      Rate and Rhythm: Normal rate.       Pulses:           Dorsalis pedis pulses are 2+ on the right side and 2+ on the left side. Posterior tibial pulses are 2+ on the right side and 2+ on the left side. Pulmonary:      Effort: Pulmonary effort is normal. No respiratory distress. Musculoskeletal:      Right lower leg: 3+ Edema present. Left lower leg: 3+ Edema present. Skin:     Findings: Wound present. Neurological:      Mental Status: He is alert. Wound 08/23/23 Traumatic Leg Right; Lower (Active)   Wound Image   08/23/23 1321   Wound Description Epithelialization;Pink;Yellow 08/23/23 1321   Tori-wound Assessment Pink;Purple 08/23/23 1321   Wound Length (cm) 11 cm 08/23/23 1321   Wound Width (cm) 2.4 cm 08/23/23 1321   Wound Depth (cm) 0.1 cm 08/23/23 1321   Wound Surface Area (cm^2) 26.4 cm^2 08/23/23 1321   Wound Volume (cm^3) 2.64 cm^3 08/23/23 1321   Calculated Wound Volume (cm^3) 2.64 cm^3 08/23/23 1321   Drainage Amount Small 08/23/23 1321   Drainage Description Serosanguineous 08/23/23 1321   Non-staged Wound Description Full thickness 08/23/23 1321   Treatments Cleansed 08/23/23 1321   Patient Tolerance Tolerated well 08/23/23 1321       Wound 08/23/23 Traumatic Leg Left; Lower (Active)   Wound Image   08/23/23 1321   Wound Description Epithelialization;Pink;Yellow 08/23/23 1321   Tori-wound Assessment Pink;Purple 08/23/23 1321   Wound Length (cm) 1.6 cm 08/23/23 1321   Wound Width (cm) 1.7 cm 08/23/23 1321   Wound Depth (cm) 0.1 cm 08/23/23 1321   Wound Surface Area (cm^2) 2.72 cm^2 08/23/23 1321   Wound Volume (cm^3) 0.272 cm^3 08/23/23 1321   Calculated Wound Volume (cm^3) 0.27 cm^3 08/23/23 1321   Drainage Amount Small 08/23/23 1321   Drainage Description Serosanguineous 08/23/23 1321   Non-staged Wound Description Full thickness 08/23/23 1321   Treatments Cleansed 08/23/23 1321   Patient Tolerance Tolerated well 08/23/23 1321              Debridement   Wound 08/23/23 Traumatic Leg Right; Lower    Universal Protocol:  Consent: Verbal consent obtained.   Consent given by: patient  Time out: Immediately prior to procedure a "time out" was called to verify the correct patient, procedure, equipment, support staff and site/side marked as required. Patient understanding: patient states understanding of the procedure being performed  Patient identity confirmed: verbally with patient      Performed by: PA  Debridement type: selective  Pain control: lidocaine 4%  Post-debridement measurements  Length (cm): 11  Width (cm): 2.4  Depth (cm): 0.1  Percent debrided: 30%  Surface Area (cm^2): 26.4  Area debrided (cm^2): 7.92  Volume (cm^3): 2.64    Devitalized tissue debrided: exudate  Instrument(s) utilized: curette  Bleeding: small  Hemostasis obtained with: pressure  Procedural pain (0-10): 0  Post-procedural pain: 0   Response to treatment: procedure was tolerated well    Debridement   Wound 08/23/23 Traumatic Leg Left; Lower    Universal Protocol:  Consent: Verbal consent obtained. Consent given by: patient  Time out: Immediately prior to procedure a "time out" was called to verify the correct patient, procedure, equipment, support staff and site/side marked as required. Patient understanding: patient states understanding of the procedure being performed  Patient identity confirmed: verbally with patient      Performed by: PA  Debridement type: selective  Pain control: lidocaine 4%  Post-debridement measurements  Length (cm): 1.6  Width (cm): 1.7  Depth (cm): 0.1  Percent debrided: 50%  Surface Area (cm^2): 2.72  Area debrided (cm^2): 1.36  Volume (cm^3): 0.27    Devitalized tissue debrided: exudate  Instrument(s) utilized: curette  Bleeding: small  Hemostasis obtained with: pressure  Procedural pain (0-10): 0  Post-procedural pain: 0   Response to treatment: procedure was tolerated well                     Wound Instructions:  Orders Placed This Encounter   Procedures   • Wound cleansing and dressings     Wash your hands with soap and water.  Remove old dressing, discard into plastic bag and place in trash. Cleanse right and left lower legs and wounds with soap and water (Dove for sensitive skin). prior to applying a clean dressing. Do not use tissue or cotton balls. Do not scrub the wound. Pat dry using gauze. Apply Dermagran gauze to the right and left lower leg wounds. Cover with gauze/abdominal sponge, then secure with nelda and tape. Change dressing every day and as needed for excessive drainage or leakage. This was done today. Elastic Tubular Stocking-F    Apply from base of toes to behind the knee. Apply every morning, may remove for sleep. Avoid prolonged standing in one place. Elevate leg(s) above the level of the heart when sitting or as much as possible. Shower yes     Please stop or decrease smoking. and Increase the dietary protein in your diet. Please call the [phone] Monday through Friday between the hours of 8:00 AM and 4:30 PM at [phone] if you have any questions, if you experience any major changes in your wound(s) or for any signs or symptoms of infection such as fever; changes in the redness, swelling, drainage, or odor of your wound. After hours, weekends or holidays please contact your primary care physician or go to the hospital emergency room.      Follow up in one week     Standing Status:   Future     Standing Expiration Date:   8/23/2024   • Debridement     This order was created via procedure documentation   • Debridement     This order was created via procedure documentation       Cally Mikle Knuckles, PA-C    Portions of the record may have been created with voice recognition software. Occasional wrong word or "sound alike" substitutions may have occurred due to the inherent limitations of voice recognition software. Read the chart carefully and recognize, using context, where substitutions have occurred.

## 2024-01-15 ENCOUNTER — OFFICE VISIT (OUTPATIENT)
Dept: INTERNAL MEDICINE CLINIC | Age: 59
End: 2024-01-15
Payer: COMMERCIAL

## 2024-01-15 ENCOUNTER — TELEPHONE (OUTPATIENT)
Dept: LAB | Facility: HOSPITAL | Age: 59
End: 2024-01-15

## 2024-01-15 VITALS
HEIGHT: 67 IN | SYSTOLIC BLOOD PRESSURE: 130 MMHG | DIASTOLIC BLOOD PRESSURE: 82 MMHG | BODY MASS INDEX: 47.09 KG/M2 | OXYGEN SATURATION: 97 % | TEMPERATURE: 97.8 F | WEIGHT: 300 LBS | HEART RATE: 88 BPM

## 2024-01-15 DIAGNOSIS — Z15.89 HLA B27 (HLA B27 POSITIVE): ICD-10-CM

## 2024-01-15 DIAGNOSIS — Z12.5 SCREENING PSA (PROSTATE SPECIFIC ANTIGEN): ICD-10-CM

## 2024-01-15 DIAGNOSIS — E55.9 VITAMIN D DEFICIENCY: ICD-10-CM

## 2024-01-15 DIAGNOSIS — M47.9 SPONDYLOSIS: ICD-10-CM

## 2024-01-15 DIAGNOSIS — R60.0 LOWER EXTREMITY EDEMA: ICD-10-CM

## 2024-01-15 DIAGNOSIS — L03.116 BILATERAL LOWER LEG CELLULITIS: ICD-10-CM

## 2024-01-15 DIAGNOSIS — M47.9 SPONDYLARTHROSIS: Primary | ICD-10-CM

## 2024-01-15 DIAGNOSIS — E78.1 HYPERTRIGLYCERIDEMIA: ICD-10-CM

## 2024-01-15 DIAGNOSIS — K21.00 GASTROESOPHAGEAL REFLUX DISEASE WITH ESOPHAGITIS WITHOUT HEMORRHAGE: ICD-10-CM

## 2024-01-15 DIAGNOSIS — J43.2 CENTRILOBULAR EMPHYSEMA (HCC): Primary | ICD-10-CM

## 2024-01-15 DIAGNOSIS — F33.41 RECURRENT MAJOR DEPRESSIVE DISORDER, IN PARTIAL REMISSION (HCC): ICD-10-CM

## 2024-01-15 DIAGNOSIS — Z72.0 TOBACCO USE: ICD-10-CM

## 2024-01-15 DIAGNOSIS — M15.9 PRIMARY OSTEOARTHRITIS INVOLVING MULTIPLE JOINTS: ICD-10-CM

## 2024-01-15 DIAGNOSIS — L03.115 BILATERAL LOWER LEG CELLULITIS: ICD-10-CM

## 2024-01-15 PROCEDURE — 99214 OFFICE O/P EST MOD 30 MIN: CPT | Performed by: FAMILY MEDICINE

## 2024-01-15 RX ORDER — VENLAFAXINE HYDROCHLORIDE 75 MG/1
75 CAPSULE, EXTENDED RELEASE ORAL DAILY
Status: CANCELLED | OUTPATIENT
Start: 2024-01-15

## 2024-01-15 RX ORDER — OMEGA-3-ACID ETHYL ESTERS 1 G/1
2 CAPSULE, LIQUID FILLED ORAL 2 TIMES DAILY
Qty: 60 CAPSULE | Refills: 5 | Status: SHIPPED | OUTPATIENT
Start: 2024-01-15

## 2024-01-15 RX ORDER — ERGOCALCIFEROL 1.25 MG/1
50000 CAPSULE ORAL WEEKLY
Qty: 12 CAPSULE | Refills: 1 | Status: SHIPPED | OUTPATIENT
Start: 2024-01-15

## 2024-01-15 RX ORDER — OMEPRAZOLE 20 MG/1
20 CAPSULE, DELAYED RELEASE ORAL DAILY
Qty: 90 CAPSULE | Refills: 1 | Status: SHIPPED | OUTPATIENT
Start: 2024-01-15

## 2024-01-15 RX ORDER — FUROSEMIDE 20 MG/1
20 TABLET ORAL DAILY
Qty: 30 TABLET | Refills: 5 | Status: SHIPPED | OUTPATIENT
Start: 2024-01-15

## 2024-01-15 RX ORDER — DOXYCYCLINE HYCLATE 100 MG/1
100 CAPSULE ORAL EVERY 12 HOURS SCHEDULED
Qty: 20 CAPSULE | Refills: 0 | Status: SHIPPED | OUTPATIENT
Start: 2024-01-15 | End: 2024-01-25

## 2024-01-15 NOTE — TELEPHONE ENCOUNTER
Received call from Karlie Arevalo's office (Rheumatology) they do not take patient's insurance. Please issue a new one.

## 2024-01-15 NOTE — PROGRESS NOTES
Assessment/Plan:    1. Centrilobular emphysema (HCC)    2. Gastroesophageal reflux disease with esophagitis without hemorrhage  -     omeprazole (PriLOSEC) 20 mg delayed release capsule; Take 1 capsule (20 mg total) by mouth in the morning    3. Hypertriglyceridemia  -     omega-3-acid ethyl esters (LOVAZA) 1 g capsule; Take 2 capsules (2 g total) by mouth 2 (two) times a day  -     Lipid Panel with Direct LDL reflex; Future  -     Comprehensive metabolic panel; Future  -     CBC and differential; Future    4. Vitamin D deficiency  -     ergocalciferol (VITAMIN D2) 50,000 units; Take 1 capsule (50,000 Units total) by mouth once a week  -     Vitamin D 25 hydroxy; Future    5. Lower extremity edema  -     furosemide (LASIX) 20 mg tablet; Take 1 tablet (20 mg total) by mouth daily  -     TSH, 3rd generation with Free T4 reflex; Future    6. Tobacco use    7. Primary osteoarthritis involving multiple joints    8. Recurrent major depressive disorder, in partial remission (HCC)  -     TSH, 3rd generation with Free T4 reflex; Future    9. Screening PSA (prostate specific antigen)  -     PSA, Total Screen; Future    10. Bilateral lower leg cellulitis  -     doxycycline hyclate (VIBRAMYCIN) 100 mg capsule; Take 1 capsule (100 mg total) by mouth every 12 (twelve) hours for 10 days    11. Spondylosis    12. HLA B27 (HLA B27 positive)            There are no Patient Instructions on file for this visit.    Return in about 6 months (around 7/15/2024).    Subjective:      Patient ID: Mac Wick is a 58 y.o. male.    Chief Complaint   Patient presents with    Follow-up     6 month follow up,          Depression screening     Leg Swelling     Patient is concerned with both lower legs retaining water and patient states he has scaling on lower legs        HPI      Lower extremity edema, has bad knees and saw a rheumatology who took off some fluid and placed steroid in there.  It did originally feel better but he feels the  fluid reaccumulated.  July 2023, still with lower extremity edema but not worse, gets worse throughout the day   Has some concentric remodeling on echocardiogram with ejection fraction 65%,  January 2024, few weeks ago he started redness on his lower extremities.  Not taking any Lasix.  Has not gotten any blood work done,     Significant C-spine surgery with healed surgical scar, has an appointment with his specialist for disability.  July 2023, stable     BMI 44, patient eating significant carbohydrates and not eating much protein.   July 2023, has not been able to lose weight        Depression, on Effexor by his psychiatrist.  We do not manage this          The following portions of the patient's history were reviewed and updated as appropriate: allergies, current medications, past family history, past medical history, past social history, past surgical history and problem list.    Review of Systems      Constitutional:  Denies fever or chills   Eyes:  Denies double , blurry vision or eye pain  HENT:  Denies nasal congestion, sore throat or new hearing issues  Respiratory:  Denies cough or shortness of breath or wheezing  Cardiovascular:  Denies palpitations or chest pain  GI:  Denies abdominal pain, nausea, or vomiting, no loose stools, no reflux  Integument:  Denies rash , no open areas  Neurologic:  Denies headache or focal weakness, no dizziness  : no dysuria, or hematuria      Current Outpatient Medications   Medication Sig Dispense Refill    ARIPiprazole (ABILIFY) 10 mg tablet Take 10 mg by mouth in the morning      doxycycline hyclate (VIBRAMYCIN) 100 mg capsule Take 1 capsule (100 mg total) by mouth every 12 (twelve) hours for 10 days 20 capsule 0    ergocalciferol (VITAMIN D2) 50,000 units Take 1 capsule (50,000 Units total) by mouth once a week 12 capsule 1    furosemide (LASIX) 20 mg tablet Take 1 tablet (20 mg total) by mouth daily 30 tablet 5    methadone (DOLOPHINE) 10 MG/5ML solution Take 150 mg  "by mouth daily      omega-3-acid ethyl esters (LOVAZA) 1 g capsule Take 2 capsules (2 g total) by mouth 2 (two) times a day 60 capsule 5    omeprazole (PriLOSEC) 20 mg delayed release capsule Take 1 capsule (20 mg total) by mouth in the morning 90 capsule 1    venlafaxine (EFFEXOR-XR) 75 mg 24 hr capsule Take 75 mg by mouth in the morning      Adalimumab (Humira Pen) 40 MG/0.4ML PNKT Inject 40 mg under the skin every 14 (fourteen) days (Patient not taking: Reported on 1/15/2024)      methocarbamol (ROBAXIN) 750 mg tablet Take 1 tablet (750 mg total) by mouth every 6 (six) hours as needed for muscle spasms (Patient not taking: Reported on 1/15/2024) 90 tablet 1     No current facility-administered medications for this visit.       Objective:    /82 (BP Location: Left arm, Patient Position: Sitting, Cuff Size: Large)   Pulse 88   Temp 97.8 °F (36.6 °C) (Temporal)   Ht 5' 7\" (1.702 m)   Wt 136 kg (300 lb)   SpO2 97%   BMI 46.99 kg/m²        Physical Exam      Constitutional:  Well developed, well nourished, no acute distress, non-toxic appearance   Eyes:  PERRL, conjunctiva normal , non icteric sclera  HENT:  Atraumatic, oropharynx moist. Neck-  supple   Respiratory:  CTA b/l, normal breath sounds, no rales, no wheezing   Cardiovascular:  RRR, no murmurs, +3 LE edema b/l  GI:  Soft, nondistended, normal bowel sounds x 4, nontender, no organomegaly, no mass, no rebound, no guarding   Neurologic:  no focal deficits noted   Psychiatric:  Speech and behavior appropriate , AAO x 3  Skin, see photo, warm to touch         Eneida Thornton, DO  "

## 2024-01-31 ENCOUNTER — APPOINTMENT (OUTPATIENT)
Dept: LAB | Facility: HOSPITAL | Age: 59
End: 2024-01-31
Attending: FAMILY MEDICINE
Payer: COMMERCIAL

## 2024-01-31 DIAGNOSIS — Z12.5 SCREENING PSA (PROSTATE SPECIFIC ANTIGEN): ICD-10-CM

## 2024-01-31 DIAGNOSIS — F33.41 RECURRENT MAJOR DEPRESSIVE DISORDER, IN PARTIAL REMISSION (HCC): ICD-10-CM

## 2024-01-31 DIAGNOSIS — R60.0 LOWER EXTREMITY EDEMA: ICD-10-CM

## 2024-01-31 DIAGNOSIS — E55.9 VITAMIN D DEFICIENCY: ICD-10-CM

## 2024-01-31 DIAGNOSIS — E78.00 HYPERCHOLESTEREMIA: ICD-10-CM

## 2024-01-31 DIAGNOSIS — R73.09 ABNORMAL GLUCOSE: Primary | ICD-10-CM

## 2024-01-31 DIAGNOSIS — E78.1 HYPERTRIGLYCERIDEMIA: ICD-10-CM

## 2024-01-31 LAB
25(OH)D3 SERPL-MCNC: 24.4 NG/ML (ref 30–100)
ALBUMIN SERPL BCP-MCNC: 4.1 G/DL (ref 3.5–5)
ALP SERPL-CCNC: 65 U/L (ref 34–104)
ALT SERPL W P-5'-P-CCNC: 51 U/L (ref 7–52)
ANION GAP SERPL CALCULATED.3IONS-SCNC: 5 MMOL/L
AST SERPL W P-5'-P-CCNC: 48 U/L (ref 13–39)
BASOPHILS # BLD AUTO: 0.03 THOUSANDS/ÂΜL (ref 0–0.1)
BASOPHILS NFR BLD AUTO: 0 % (ref 0–1)
BILIRUB SERPL-MCNC: 0.52 MG/DL (ref 0.2–1)
BUN SERPL-MCNC: 16 MG/DL (ref 5–25)
CALCIUM SERPL-MCNC: 9.4 MG/DL (ref 8.4–10.2)
CHLORIDE SERPL-SCNC: 103 MMOL/L (ref 96–108)
CHOLEST SERPL-MCNC: 167 MG/DL
CO2 SERPL-SCNC: 29 MMOL/L (ref 21–32)
CREAT SERPL-MCNC: 0.76 MG/DL (ref 0.6–1.3)
EOSINOPHIL # BLD AUTO: 0.25 THOUSAND/ÂΜL (ref 0–0.61)
EOSINOPHIL NFR BLD AUTO: 3 % (ref 0–6)
ERYTHROCYTE [DISTWIDTH] IN BLOOD BY AUTOMATED COUNT: 13.2 % (ref 11.6–15.1)
GFR SERPL CREATININE-BSD FRML MDRD: 101 ML/MIN/1.73SQ M
GLUCOSE P FAST SERPL-MCNC: 117 MG/DL (ref 65–99)
HCT VFR BLD AUTO: 39 % (ref 36.5–49.3)
HDLC SERPL-MCNC: 34 MG/DL
HGB BLD-MCNC: 12.6 G/DL (ref 12–17)
IMM GRANULOCYTES # BLD AUTO: 0.02 THOUSAND/UL (ref 0–0.2)
IMM GRANULOCYTES NFR BLD AUTO: 0 % (ref 0–2)
LDLC SERPL CALC-MCNC: 94 MG/DL (ref 0–100)
LYMPHOCYTES # BLD AUTO: 2.6 THOUSANDS/ÂΜL (ref 0.6–4.47)
LYMPHOCYTES NFR BLD AUTO: 34 % (ref 14–44)
MCH RBC QN AUTO: 28.5 PG (ref 26.8–34.3)
MCHC RBC AUTO-ENTMCNC: 32.3 G/DL (ref 31.4–37.4)
MCV RBC AUTO: 88 FL (ref 82–98)
MONOCYTES # BLD AUTO: 0.56 THOUSAND/ÂΜL (ref 0.17–1.22)
MONOCYTES NFR BLD AUTO: 7 % (ref 4–12)
NEUTROPHILS # BLD AUTO: 4.25 THOUSANDS/ÂΜL (ref 1.85–7.62)
NEUTS SEG NFR BLD AUTO: 56 % (ref 43–75)
NRBC BLD AUTO-RTO: 0 /100 WBCS
PLATELET # BLD AUTO: 250 THOUSANDS/UL (ref 149–390)
PMV BLD AUTO: 8.6 FL (ref 8.9–12.7)
POTASSIUM SERPL-SCNC: 4.4 MMOL/L (ref 3.5–5.3)
PROT SERPL-MCNC: 8 G/DL (ref 6.4–8.4)
PSA SERPL-MCNC: 0.33 NG/ML (ref 0–4)
RBC # BLD AUTO: 4.42 MILLION/UL (ref 3.88–5.62)
SODIUM SERPL-SCNC: 137 MMOL/L (ref 135–147)
TRIGL SERPL-MCNC: 193 MG/DL
TSH SERPL DL<=0.05 MIU/L-ACNC: 1.41 UIU/ML (ref 0.45–4.5)
WBC # BLD AUTO: 7.71 THOUSAND/UL (ref 4.31–10.16)

## 2024-01-31 PROCEDURE — 36415 COLL VENOUS BLD VENIPUNCTURE: CPT

## 2024-01-31 PROCEDURE — 80061 LIPID PANEL: CPT

## 2024-01-31 PROCEDURE — 85025 COMPLETE CBC W/AUTO DIFF WBC: CPT

## 2024-01-31 PROCEDURE — 80053 COMPREHEN METABOLIC PANEL: CPT

## 2024-01-31 PROCEDURE — G0103 PSA SCREENING: HCPCS

## 2024-01-31 PROCEDURE — 82306 VITAMIN D 25 HYDROXY: CPT

## 2024-01-31 PROCEDURE — 84443 ASSAY THYROID STIM HORMONE: CPT

## 2024-04-01 ENCOUNTER — TELEPHONE (OUTPATIENT)
Age: 59
End: 2024-04-01

## 2024-04-01 NOTE — TELEPHONE ENCOUNTER
Pt states no further appts at this time/ he will c/b when he is ready/ pt states going through medication detox right now.

## 2024-04-19 ENCOUNTER — HOSPITAL ENCOUNTER (EMERGENCY)
Facility: HOSPITAL | Age: 59
End: 2024-04-20
Attending: EMERGENCY MEDICINE
Payer: COMMERCIAL

## 2024-04-19 DIAGNOSIS — Z76.89 ENCOUNTER FOR PSYCHIATRIC ASSESSMENT: Primary | ICD-10-CM

## 2024-04-19 LAB
ALBUMIN SERPL BCP-MCNC: 4.3 G/DL (ref 3.5–5)
ALP SERPL-CCNC: 87 U/L (ref 34–104)
ALT SERPL W P-5'-P-CCNC: 44 U/L (ref 7–52)
AMPHETAMINES SERPL QL SCN: POSITIVE
ANION GAP SERPL CALCULATED.3IONS-SCNC: 7 MMOL/L (ref 4–13)
AST SERPL W P-5'-P-CCNC: 33 U/L (ref 13–39)
ATRIAL RATE: 90 BPM
BACTERIA UR QL AUTO: ABNORMAL /HPF
BARBITURATES UR QL: NEGATIVE
BASOPHILS # BLD AUTO: 0.04 THOUSANDS/ÂΜL (ref 0–0.1)
BASOPHILS NFR BLD AUTO: 0 % (ref 0–1)
BENZODIAZ UR QL: NEGATIVE
BILIRUB SERPL-MCNC: 0.38 MG/DL (ref 0.2–1)
BILIRUB UR QL STRIP: NEGATIVE
BUN SERPL-MCNC: 14 MG/DL (ref 5–25)
CALCIUM SERPL-MCNC: 9.4 MG/DL (ref 8.4–10.2)
CHLORIDE SERPL-SCNC: 106 MMOL/L (ref 96–108)
CLARITY UR: CLEAR
CO2 SERPL-SCNC: 26 MMOL/L (ref 21–32)
COCAINE UR QL: NEGATIVE
COLOR UR: ABNORMAL
CREAT SERPL-MCNC: 0.76 MG/DL (ref 0.6–1.3)
EOSINOPHIL # BLD AUTO: 0.14 THOUSAND/ÂΜL (ref 0–0.61)
EOSINOPHIL NFR BLD AUTO: 1 % (ref 0–6)
ERYTHROCYTE [DISTWIDTH] IN BLOOD BY AUTOMATED COUNT: 13.3 % (ref 11.6–15.1)
ETHANOL EXG-MCNC: 0 MG/DL
FENTANYL UR QL SCN: NEGATIVE
GFR SERPL CREATININE-BSD FRML MDRD: 101 ML/MIN/1.73SQ M
GLUCOSE SERPL-MCNC: 112 MG/DL (ref 65–140)
GLUCOSE UR STRIP-MCNC: NEGATIVE MG/DL
GRAN CASTS #/AREA URNS LPF: ABNORMAL /[LPF]
HCT VFR BLD AUTO: 41.7 % (ref 36.5–49.3)
HGB BLD-MCNC: 14.3 G/DL (ref 12–17)
HGB UR QL STRIP.AUTO: NEGATIVE
HYDROCODONE UR QL SCN: NEGATIVE
IMM GRANULOCYTES # BLD AUTO: 0.03 THOUSAND/UL (ref 0–0.2)
IMM GRANULOCYTES NFR BLD AUTO: 0 % (ref 0–2)
KETONES UR STRIP-MCNC: NEGATIVE MG/DL
LEUKOCYTE ESTERASE UR QL STRIP: NEGATIVE
LYMPHOCYTES # BLD AUTO: 3.37 THOUSANDS/ÂΜL (ref 0.6–4.47)
LYMPHOCYTES NFR BLD AUTO: 33 % (ref 14–44)
MCH RBC QN AUTO: 29.7 PG (ref 26.8–34.3)
MCHC RBC AUTO-ENTMCNC: 34.3 G/DL (ref 31.4–37.4)
MCV RBC AUTO: 87 FL (ref 82–98)
METHADONE UR QL: POSITIVE
MONOCYTES # BLD AUTO: 0.66 THOUSAND/ÂΜL (ref 0.17–1.22)
MONOCYTES NFR BLD AUTO: 7 % (ref 4–12)
MUCOUS THREADS UR QL AUTO: ABNORMAL
NEUTROPHILS # BLD AUTO: 5.92 THOUSANDS/ÂΜL (ref 1.85–7.62)
NEUTS SEG NFR BLD AUTO: 59 % (ref 43–75)
NITRITE UR QL STRIP: NEGATIVE
NON-SQ EPI CELLS URNS QL MICRO: ABNORMAL /HPF
NRBC BLD AUTO-RTO: 0 /100 WBCS
OPIATES UR QL SCN: NEGATIVE
OXYCODONE+OXYMORPHONE UR QL SCN: NEGATIVE
P AXIS: 55 DEGREES
PCP UR QL: NEGATIVE
PH UR STRIP.AUTO: 5.5 [PH]
PLATELET # BLD AUTO: 262 THOUSANDS/UL (ref 149–390)
PMV BLD AUTO: 8.4 FL (ref 8.9–12.7)
POTASSIUM SERPL-SCNC: 4 MMOL/L (ref 3.5–5.3)
PR INTERVAL: 170 MS
PROT SERPL-MCNC: 7.8 G/DL (ref 6.4–8.4)
PROT UR STRIP-MCNC: ABNORMAL MG/DL
QRS AXIS: -67 DEGREES
QRSD INTERVAL: 90 MS
QT INTERVAL: 364 MS
QTC INTERVAL: 445 MS
RBC # BLD AUTO: 4.81 MILLION/UL (ref 3.88–5.62)
RBC #/AREA URNS AUTO: ABNORMAL /HPF
SODIUM SERPL-SCNC: 139 MMOL/L (ref 135–147)
SP GR UR STRIP.AUTO: 1.02 (ref 1–1.03)
T WAVE AXIS: 51 DEGREES
THC UR QL: POSITIVE
TSH SERPL DL<=0.05 MIU/L-ACNC: 1.22 UIU/ML (ref 0.45–4.5)
UROBILINOGEN UR STRIP-ACNC: <2 MG/DL
VENTRICULAR RATE: 90 BPM
WBC # BLD AUTO: 10.16 THOUSAND/UL (ref 4.31–10.16)
WBC #/AREA URNS AUTO: ABNORMAL /HPF

## 2024-04-19 PROCEDURE — 82075 ASSAY OF BREATH ETHANOL: CPT

## 2024-04-19 PROCEDURE — 93010 ELECTROCARDIOGRAM REPORT: CPT | Performed by: INTERNAL MEDICINE

## 2024-04-19 PROCEDURE — 36415 COLL VENOUS BLD VENIPUNCTURE: CPT

## 2024-04-19 PROCEDURE — 84443 ASSAY THYROID STIM HORMONE: CPT

## 2024-04-19 PROCEDURE — 99285 EMERGENCY DEPT VISIT HI MDM: CPT | Performed by: EMERGENCY MEDICINE

## 2024-04-19 PROCEDURE — 85025 COMPLETE CBC W/AUTO DIFF WBC: CPT

## 2024-04-19 PROCEDURE — 93005 ELECTROCARDIOGRAM TRACING: CPT

## 2024-04-19 PROCEDURE — 80053 COMPREHEN METABOLIC PANEL: CPT

## 2024-04-19 PROCEDURE — 99284 EMERGENCY DEPT VISIT MOD MDM: CPT

## 2024-04-19 PROCEDURE — 80307 DRUG TEST PRSMV CHEM ANLYZR: CPT

## 2024-04-19 PROCEDURE — 81001 URINALYSIS AUTO W/SCOPE: CPT

## 2024-04-19 RX ORDER — IBUPROFEN 600 MG/1
600 TABLET ORAL ONCE
Status: COMPLETED | OUTPATIENT
Start: 2024-04-19 | End: 2024-04-19

## 2024-04-19 RX ORDER — HYDROXYZINE HYDROCHLORIDE 25 MG/1
25 TABLET, FILM COATED ORAL
Status: CANCELLED | OUTPATIENT
Start: 2024-04-19

## 2024-04-19 RX ORDER — ACETAMINOPHEN 325 MG/1
650 TABLET ORAL EVERY 4 HOURS PRN
Status: CANCELLED | OUTPATIENT
Start: 2024-04-19

## 2024-04-19 RX ORDER — HALOPERIDOL 5 MG/ML
5 INJECTION INTRAMUSCULAR
Status: CANCELLED | OUTPATIENT
Start: 2024-04-19

## 2024-04-19 RX ORDER — ACETAMINOPHEN 325 MG/1
975 TABLET ORAL EVERY 6 HOURS PRN
Status: CANCELLED | OUTPATIENT
Start: 2024-04-19

## 2024-04-19 RX ORDER — VENLAFAXINE HYDROCHLORIDE 75 MG/1
75 CAPSULE, EXTENDED RELEASE ORAL DAILY
Status: DISCONTINUED | OUTPATIENT
Start: 2024-04-20 | End: 2024-04-20 | Stop reason: HOSPADM

## 2024-04-19 RX ORDER — FUROSEMIDE 20 MG/1
20 TABLET ORAL DAILY
Status: DISCONTINUED | OUTPATIENT
Start: 2024-04-20 | End: 2024-04-20 | Stop reason: HOSPADM

## 2024-04-19 RX ORDER — ARIPIPRAZOLE 10 MG/1
10 TABLET ORAL DAILY
Status: DISCONTINUED | OUTPATIENT
Start: 2024-04-20 | End: 2024-04-20 | Stop reason: HOSPADM

## 2024-04-19 RX ORDER — NICOTINE 21 MG/24HR
21 PATCH, TRANSDERMAL 24 HOURS TRANSDERMAL DAILY
Status: DISCONTINUED | OUTPATIENT
Start: 2024-04-20 | End: 2024-04-20

## 2024-04-19 RX ORDER — NICOTINE 21 MG/24HR
21 PATCH, TRANSDERMAL 24 HOURS TRANSDERMAL ONCE
Status: DISCONTINUED | OUTPATIENT
Start: 2024-04-19 | End: 2024-04-19

## 2024-04-19 RX ORDER — LORAZEPAM 2 MG/ML
1 INJECTION INTRAMUSCULAR
Status: CANCELLED | OUTPATIENT
Start: 2024-04-19

## 2024-04-19 RX ORDER — RISPERIDONE 1 MG/1
1 TABLET ORAL
Status: CANCELLED | OUTPATIENT
Start: 2024-04-19

## 2024-04-19 RX ORDER — RISPERIDONE 0.25 MG/1
0.5 TABLET ORAL
Status: CANCELLED | OUTPATIENT
Start: 2024-04-19

## 2024-04-19 RX ORDER — RISPERIDONE 0.25 MG/1
0.25 TABLET ORAL
Status: CANCELLED | OUTPATIENT
Start: 2024-04-19

## 2024-04-19 RX ORDER — LANOLIN ALCOHOL/MO/W.PET/CERES
3 CREAM (GRAM) TOPICAL
Status: DISCONTINUED | OUTPATIENT
Start: 2024-04-19 | End: 2024-04-20 | Stop reason: HOSPADM

## 2024-04-19 RX ORDER — MAGNESIUM HYDROXIDE/ALUMINUM HYDROXICE/SIMETHICONE 120; 1200; 1200 MG/30ML; MG/30ML; MG/30ML
30 SUSPENSION ORAL EVERY 4 HOURS PRN
Status: CANCELLED | OUTPATIENT
Start: 2024-04-19

## 2024-04-19 RX ORDER — PANTOPRAZOLE SODIUM 20 MG/1
20 TABLET, DELAYED RELEASE ORAL
Status: DISCONTINUED | OUTPATIENT
Start: 2024-04-20 | End: 2024-04-20 | Stop reason: HOSPADM

## 2024-04-19 RX ORDER — ACETAMINOPHEN 325 MG/1
650 TABLET ORAL ONCE
Status: DISCONTINUED | OUTPATIENT
Start: 2024-04-19 | End: 2024-04-20

## 2024-04-19 RX ORDER — IBUPROFEN 400 MG/1
400 TABLET ORAL ONCE
Status: COMPLETED | OUTPATIENT
Start: 2024-04-19 | End: 2024-04-19

## 2024-04-19 RX ORDER — LANOLIN ALCOHOL/MO/W.PET/CERES
3 CREAM (GRAM) TOPICAL
Status: CANCELLED | OUTPATIENT
Start: 2024-04-19

## 2024-04-19 RX ORDER — LORAZEPAM 1 MG/1
1 TABLET ORAL ONCE
Status: COMPLETED | OUTPATIENT
Start: 2024-04-19 | End: 2024-04-19

## 2024-04-19 RX ADMIN — IBUPROFEN 600 MG: 600 TABLET, FILM COATED ORAL at 22:21

## 2024-04-19 RX ADMIN — LORAZEPAM 1 MG: 1 TABLET ORAL at 14:51

## 2024-04-19 RX ADMIN — IBUPROFEN 400 MG: 400 TABLET, FILM COATED ORAL at 14:51

## 2024-04-19 RX ADMIN — Medication 3 MG: at 22:21

## 2024-04-19 NOTE — ED PROVIDER NOTES
"History  Chief Complaint   Patient presents with    Psychiatric Evaluation     302 for HI threats     HPI    Patient is a 58-year-old male with a history of schizophrenia and bipolar presents for a mental health evaluation.  Patient's mother petitioned 302 for homicidal ideations and increased aggression. Per 302, they have video footage of the patient saying he would like to \"put his girlfriend in a hole\" and \"send his father into the ground.\" He was also heard saying that he could hurt his father with thyroid medication and fomaldehyde.  He denies that this happened.  He denies SI, HI, and hallucinations.  He is not compliant with his psychiatric medications.  He denies alcohol or drug use.    Prior to Admission Medications   Prescriptions Last Dose Informant Patient Reported? Taking?   ARIPiprazole (ABILIFY) 10 mg tablet  Self Yes No   Sig: Take 10 mg by mouth in the morning   Adalimumab (Humira Pen) 40 MG/0.4ML PNKT  Self Yes No   Sig: Inject 40 mg under the skin every 14 (fourteen) days   Patient not taking: Reported on 1/15/2024   ergocalciferol (VITAMIN D2) 50,000 units   No No   Sig: Take 1 capsule (50,000 Units total) by mouth once a week   furosemide (LASIX) 20 mg tablet   No No   Sig: Take 1 tablet (20 mg total) by mouth daily   methadone (DOLOPHINE) 10 MG/5ML solution  Self Yes No   Sig: Take 150 mg by mouth daily   methocarbamol (ROBAXIN) 750 mg tablet  Self No No   Sig: Take 1 tablet (750 mg total) by mouth every 6 (six) hours as needed for muscle spasms   Patient not taking: Reported on 1/15/2024   omega-3-acid ethyl esters (LOVAZA) 1 g capsule   No No   Sig: Take 2 capsules (2 g total) by mouth 2 (two) times a day   omeprazole (PriLOSEC) 20 mg delayed release capsule   No No   Sig: Take 1 capsule (20 mg total) by mouth in the morning   venlafaxine (EFFEXOR-XR) 75 mg 24 hr capsule  Self Yes No   Sig: Take 75 mg by mouth in the morning      Facility-Administered Medications: None       Past Medical " History:   Diagnosis Date    Anxiety     Arthritis     Cancer (HCC) 2017    skin cancer    Closed T2 fracture (HCC) 5/4/2022    COPD (chronic obstructive pulmonary disease) (HCC)     Depression     Diverticulitis of colon     Establishing care with new doctor, encounter for 8/11/2022    GERD (gastroesophageal reflux disease)     Infectious viral hepatitis     Pneumonia     Shingles        Past Surgical History:   Procedure Laterality Date    APPENDECTOMY      HIP SURGERY Left 2015    KNEE SURGERY Left 2020    orthoscopic    SPINE SURGERY  06/13/2022       Family History   Problem Relation Age of Onset    Cancer Mother     Depression Mother     Hyperlipidemia Mother     Thyroid disease Mother     Cancer Father     COPD Father     Hypertension Father     Post-traumatic stress disorder Father     Diabetes Sister     Hepatitis Sister     Cancer Sister     Hepatitis Sister     Mental illness Sister      I have reviewed and agree with the history as documented.    E-Cigarette/Vaping    E-Cigarette Use Never User      E-Cigarette/Vaping Substances    Nicotine No     THC No     CBD No     Flavoring No     Other No     Unknown No      Social History     Tobacco Use    Smoking status: Every Day     Current packs/day: 0.50     Average packs/day: 0.5 packs/day for 46.3 years (23.2 ttl pk-yrs)     Types: Cigarettes, Pipe     Start date: 1978    Smokeless tobacco: Former     Types: Chew   Vaping Use    Vaping status: Never Used   Substance Use Topics    Alcohol use: Yes     Comment: a few days ago    Drug use: Yes     Types: Marijuana     Comment: methadone        Review of Systems   Constitutional:  Negative for chills and fever.   HENT:  Negative for congestion and sore throat.    Respiratory:  Negative for cough and shortness of breath.    Cardiovascular:  Negative for chest pain and palpitations.   Gastrointestinal:  Negative for abdominal pain and vomiting.   Genitourinary:  Negative for dysuria and hematuria.    Neurological:  Negative for syncope and headaches.   All other systems reviewed and are negative.      Physical Exam  ED Triage Vitals   Temperature Pulse Respirations Blood Pressure SpO2   04/19/24 1235 04/19/24 1235 04/19/24 1235 04/19/24 1235 04/19/24 1235   98 °F (36.7 °C) 94 16 133/83 94 %      Temp src Heart Rate Source Patient Position - Orthostatic VS BP Location FiO2 (%)   -- 04/20/24 0722 -- -- --    Monitor         Pain Score       04/19/24 1235       No Pain             Orthostatic Vital Signs  Vitals:    04/19/24 1235 04/20/24 0722   BP: 133/83 134/70   Pulse: 94 63       Physical Exam  Vitals and nursing note reviewed.   Constitutional:       Appearance: He is well-developed.      Comments: Agitated   HENT:      Head: Normocephalic and atraumatic.      Nose: No congestion or rhinorrhea.      Mouth/Throat:      Mouth: Mucous membranes are moist.   Eyes:      Extraocular Movements: Extraocular movements intact.      Conjunctiva/sclera: Conjunctivae normal.   Cardiovascular:      Rate and Rhythm: Normal rate and regular rhythm.      Heart sounds: No murmur heard.  Pulmonary:      Effort: Pulmonary effort is normal. No respiratory distress.      Breath sounds: Normal breath sounds. No wheezing.   Abdominal:      Palpations: Abdomen is soft.      Tenderness: There is no abdominal tenderness.   Musculoskeletal:      Cervical back: Neck supple.   Skin:     General: Skin is warm and dry.      Capillary Refill: Capillary refill takes less than 2 seconds.   Neurological:      Mental Status: He is alert.   Psychiatric:         Mood and Affect: Affect is angry.         Behavior: Behavior is agitated.         Thought Content: Thought content does not include homicidal or suicidal ideation. Thought content does not include homicidal or suicidal plan.         ED Medications  Medications   ARIPiprazole (ABILIFY) tablet 10 mg (10 mg Oral Not Given 4/20/24 0916)   furosemide (LASIX) tablet 20 mg (20 mg Oral Not Given  4/20/24 0916)   pantoprazole (PROTONIX) EC tablet 20 mg (0 mg Oral Hold 4/20/24 0600)   venlafaxine (EFFEXOR-XR) 24 hr capsule 75 mg (75 mg Oral Not Given 4/20/24 0916)   melatonin tablet 3 mg (3 mg Oral Given 4/19/24 2221)   nicotine (NICODERM CQ) 21 mg/24 hr TD 24 hr patch 21 mg (21 mg Transdermal Medication Applied 4/20/24 0420)   ibuprofen (MOTRIN) tablet 400 mg (400 mg Oral Given 4/19/24 1451)   LORazepam (ATIVAN) tablet 1 mg (1 mg Oral Given 4/19/24 1451)   ibuprofen (MOTRIN) tablet 600 mg (600 mg Oral Given 4/19/24 2221)   ibuprofen (MOTRIN) tablet 400 mg (400 mg Oral Given 4/20/24 0451)       Diagnostic Studies  Results Reviewed       Procedure Component Value Units Date/Time    Urine Microscopic [[id number]]  (Abnormal) Collected: 04/19/24 1618    Lab Status: Final result Specimen: Urine, Clean Catch Updated: 04/19/24 1701     RBC, UA 2-4 /hpf      WBC, UA 2-4 /hpf      Epithelial Cells Occasional /hpf      Bacteria, UA Occasional /hpf      MUCUS THREADS Occasional     Granular Casts, UA 3-5    Rapid drug screen, urine [[id number]]  (Abnormal) Collected: 04/19/24 1618    Lab Status: Final result Specimen: Urine, Clean Catch Updated: 04/19/24 1658     Amph/Meth UR Positive     Barbiturate Ur Negative     Benzodiazepine Urine Negative     Cocaine Urine Negative     Methadone Urine Positive     Opiate Urine Negative     PCP Ur Negative     THC Urine Positive     Oxycodone Urine Negative     Fentanyl Urine Negative     HYDROCODONE URINE Negative    Narrative:      Presumptive report. If requested, specimen will be sent to reference lab for confirmation.  FOR MEDICAL PURPOSES ONLY.   IF CONFIRMATION NEEDED PLEASE CONTACT THE LAB WITHIN 5 DAYS.    Drug Screen Cutoff Levels:  AMPHETAMINE/METHAMPHETAMINES  1000 ng/mL  BARBITURATES     200 ng/mL  BENZODIAZEPINES     200 ng/mL  COCAINE      300 ng/mL  METHADONE      300 ng/mL  OPIATES      300 ng/mL  PHENCYCLIDINE     25 ng/mL  THC       50 ng/mL  OXYCODONE      100  ng/mL  FENTANYL      5 ng/mL  HYDROCODONE     300 ng/mL    UA w Reflex to Microscopic w Reflex to Culture [[id number]]  (Abnormal) Collected: 04/19/24 1618    Lab Status: Final result Specimen: Urine, Clean Catch Updated: 04/19/24 1656     Color, UA Light Yellow     Clarity, UA Clear     Specific Gravity, UA 1.020     pH, UA 5.5     Leukocytes, UA Negative     Nitrite, UA Negative     Protein, UA Trace mg/dl      Glucose, UA Negative mg/dl      Ketones, UA Negative mg/dl      Urobilinogen, UA <2.0 mg/dl      Bilirubin, UA Negative     Occult Blood, UA Negative    TSH, 3rd generation with Free T4 reflex [[id number]]  (Normal) Collected: 04/19/24 1503    Lab Status: Final result Specimen: Blood from Arm, Left Updated: 04/19/24 1547     TSH 3RD GENERATON 1.221 uIU/mL     Comprehensive metabolic panel [[id number]] Collected: 04/19/24 1503    Lab Status: Final result Specimen: Blood from Arm, Left Updated: 04/19/24 1535     Sodium 139 mmol/L      Potassium 4.0 mmol/L      Chloride 106 mmol/L      CO2 26 mmol/L      ANION GAP 7 mmol/L      BUN 14 mg/dL      Creatinine 0.76 mg/dL      Glucose 112 mg/dL      Calcium 9.4 mg/dL      AST 33 U/L      ALT 44 U/L      Alkaline Phosphatase 87 U/L      Total Protein 7.8 g/dL      Albumin 4.3 g/dL      Total Bilirubin 0.38 mg/dL      eGFR 101 ml/min/1.73sq m     Narrative:      National Kidney Disease Foundation guidelines for Chronic Kidney Disease (CKD):     Stage 1 with normal or high GFR (GFR > 90 mL/min/1.73 square meters)    Stage 2 Mild CKD (GFR = 60-89 mL/min/1.73 square meters)    Stage 3A Moderate CKD (GFR = 45-59 mL/min/1.73 square meters)    Stage 3B Moderate CKD (GFR = 30-44 mL/min/1.73 square meters)    Stage 4 Severe CKD (GFR = 15-29 mL/min/1.73 square meters)    Stage 5 End Stage CKD (GFR <15 mL/min/1.73 square meters)  Note: GFR calculation is accurate only with a steady state creatinine    CBC and differential [[id number]]  (Abnormal) Collected: 04/19/24 1503    Lab  Status: Final result Specimen: Blood from Arm, Left Updated: 04/19/24 1511     WBC 10.16 Thousand/uL      RBC 4.81 Million/uL      Hemoglobin 14.3 g/dL      Hematocrit 41.7 %      MCV 87 fL      MCH 29.7 pg      MCHC 34.3 g/dL      RDW 13.3 %      MPV 8.4 fL      Platelets 262 Thousands/uL      nRBC 0 /100 WBCs      Segmented % 59 %      Immature Grans % 0 %      Lymphocytes % 33 %      Monocytes % 7 %      Eosinophils Relative 1 %      Basophils Relative 0 %      Absolute Neutrophils 5.92 Thousands/µL      Absolute Immature Grans 0.03 Thousand/uL      Absolute Lymphocytes 3.37 Thousands/µL      Absolute Monocytes 0.66 Thousand/µL      Eosinophils Absolute 0.14 Thousand/µL      Basophils Absolute 0.04 Thousands/µL     POCT alcohol breath test [[id number]]  (Normal) Resulted: 04/19/24 1238    Lab Status: Final result Updated: 04/19/24 1238     EXTBreath Alcohol 0.000                   No orders to display         Procedures  ECG 12 Lead Documentation Only    Date/Time: 4/19/2024 12:47 PM    Performed by: Tameka Carr MD  Authorized by: Tameka Carr MD    ECG reviewed by me, the ED Provider: yes    Patient location:  ED  Interpretation:     Interpretation: abnormal    Rate:     ECG rate:  90    ECG rate assessment: normal    Rhythm:     Rhythm: sinus rhythm    Ectopy:     Ectopy: none    QRS:     QRS axis:  Normal    QRS intervals:  Normal  Conduction:     Conduction: abnormal      Abnormal conduction: LAFB    ST segments:     ST segments:  Normal  T waves:     T waves: inverted      Inverted:  AVR        ED Course  ED Course as of 04/20/24 1317   Fri Apr 19, 2024   1241 EXTBreath Alcohol: 0.000   1430 [facility] [phone]   1529 WBC: 10.16   1529 Hemoglobin: 14.3   1607 TSH 3RD GENERATON: 1.221   1707 AMPH/METH(!): Positive   1707 METHADONE URINE(!): Positive   1707 THC URINE(!): Positive   1707 RBC Urine(!): 2-4   1707 WBC, UA(!): 2-4   1715 Medically cleared for inpt tx   1844 [phone]- [facility]  [place]                                       Medical Decision Making  Patient is a 58-year-old male with a history of schizophrenia and bipolar presents for psychiatric evaluation.     Patient currently denying SI/HI/ hallucinations. Patient's mom petitioned for a 302. Will order geriatric psychiatric workup and consult ED crisis.  Patient's labs are unremarkable.  UDS is positive for methamphetamine, THC, and methadone.  Patient is medically cleared for inpatient psychiatric treatment.  Crisis evaluated patient and discussed with him the 302. Patient will be held while an inpatient bed is found. Home medications were ordered.    Amount and/or Complexity of Data Reviewed  Labs: ordered. Decision-making details documented in ED Course.    Risk  OTC drugs.  Prescription drug management.  Decision regarding hospitalization.          Disposition  Final diagnoses:   Encounter for psychiatric assessment     Time reflects when diagnosis was documented in both MDM as applicable and the Disposition within this note       Time User Action Codes Description Comment    4/19/2024  5:45 PM Tameka Carr Add [Z76.89] Encounter for psychiatric assessment           ED Disposition       ED Disposition   Transfer to Behavioral Health Condition   --    Date/Time   Fri Apr 19, 2024  5:45 PM    Comment   Mac Wick should be transferred out to an inpatient treatment facility and has been medically cleared.               MD Documentation      Flowsheet Row Most Recent Value   Accepting Physician Dr Perry   Accepting Facility Name, [facility] [address] [place]    (Name & Tel number) [facility] [phone]   Transported by (Company and Unit #) [facility]   Sending MD Mila Cardoso DO          RN Documentation      Flowsheet Row Most Recent Value   Accepting Facility Name, [facility] [address] [place]    (Name & Tel number) [facility] [phone]   Transported by (Company and Unit #) [facility]           Follow-up Information    None         Patient's Medications   Discharge Prescriptions    No medications on file     No discharge procedures on file.    PDMP Review       None             ED Provider  Attending physically available and evaluated Mac Wick. I managed the patient along with the ED Attending.    Electronically Signed by           Tameka Carr MD  04/20/24 6585

## 2024-04-19 NOTE — ED NOTES
Pt refusing blood work and to provide urine sample at this time.      Shazia Borrero, DIMA  04/19/24 6685

## 2024-04-19 NOTE — ED NOTES
Patient cooperated to changing his clothing with 2 witnesses in bathroom with him. His socks and underwear remain on. Refused to provide urine sample. Escorted to secure holding room 2 without any issues. Patient remains cooperative and continues to speak with a loud pressured voice. Report given to zone 2 RN.     Dilma Sanchez RN  04/19/24 6368

## 2024-04-19 NOTE — ED ATTENDING ATTESTATION
4/19/2024  I, Vahe Delacruz MD, saw and evaluated the patient. I have discussed the patient with the resident/non-physician practitioner and agree with the resident's/non-physician practitioner's findings, Plan of Care, and MDM as documented in the resident's/non-physician practitioner's note, except where noted. All available labs and Radiology studies were reviewed.  I was present for key portions of any procedure(s) performed by the resident/non-physician practitioner and I was immediately available to provide assistance.       At this point I agree with the current assessment done in the Emergency Department.  I have conducted an independent evaluation of this patient a history and physical is as follows:  Psychiatric eval    302 by family    Apparent threats of harm to self and other   Pt denies this    Has bipolar and schizophrenia  not taking his medications   Crisis consult     ED Course         Critical Care Time  Procedures

## 2024-04-20 ENCOUNTER — HOSPITAL ENCOUNTER (INPATIENT)
Facility: HOSPITAL | Age: 59
LOS: 6 days | Discharge: HOME | DRG: 753 | End: 2024-04-26
Attending: STUDENT IN AN ORGANIZED HEALTH CARE EDUCATION/TRAINING PROGRAM | Admitting: PSYCHIATRY & NEUROLOGY
Payer: COMMERCIAL

## 2024-04-20 VITALS
SYSTOLIC BLOOD PRESSURE: 134 MMHG | TEMPERATURE: 98 F | OXYGEN SATURATION: 97 % | RESPIRATION RATE: 16 BRPM | HEART RATE: 63 BPM | DIASTOLIC BLOOD PRESSURE: 70 MMHG

## 2024-04-20 DIAGNOSIS — F33.42 RECURRENT MAJOR DEPRESSIVE DISORDER, IN FULL REMISSION (HCC): ICD-10-CM

## 2024-04-20 DIAGNOSIS — Z72.0 TOBACCO USE: ICD-10-CM

## 2024-04-20 DIAGNOSIS — Z76.89 ENCOUNTER FOR PSYCHIATRIC ASSESSMENT: ICD-10-CM

## 2024-04-20 DIAGNOSIS — M50.30 DDD (DEGENERATIVE DISC DISEASE), CERVICAL: ICD-10-CM

## 2024-04-20 DIAGNOSIS — F39 MOOD DISORDER (HCC): Primary | ICD-10-CM

## 2024-04-20 DIAGNOSIS — G47.00 INSOMNIA: ICD-10-CM

## 2024-04-20 DIAGNOSIS — E53.8 B12 DEFICIENCY: ICD-10-CM

## 2024-04-20 RX ORDER — HALOPERIDOL 5 MG/ML
5 INJECTION INTRAMUSCULAR
Status: DISCONTINUED | OUTPATIENT
Start: 2024-04-20 | End: 2024-04-26 | Stop reason: HOSPADM

## 2024-04-20 RX ORDER — ACETAMINOPHEN 325 MG/1
650 TABLET ORAL ONCE
Status: DISCONTINUED | OUTPATIENT
Start: 2024-04-20 | End: 2024-04-20

## 2024-04-20 RX ORDER — HYDROXYZINE HYDROCHLORIDE 25 MG/1
25 TABLET, FILM COATED ORAL
Status: DISCONTINUED | OUTPATIENT
Start: 2024-04-20 | End: 2024-04-26 | Stop reason: HOSPADM

## 2024-04-20 RX ORDER — NICOTINE 21 MG/24HR
21 PATCH, TRANSDERMAL 24 HOURS TRANSDERMAL DAILY
Status: DISCONTINUED | OUTPATIENT
Start: 2024-04-20 | End: 2024-04-20 | Stop reason: HOSPADM

## 2024-04-20 RX ORDER — ACETAMINOPHEN 325 MG/1
650 TABLET ORAL EVERY 4 HOURS PRN
Status: DISCONTINUED | OUTPATIENT
Start: 2024-04-20 | End: 2024-04-21

## 2024-04-20 RX ORDER — IBUPROFEN 400 MG/1
400 TABLET ORAL ONCE
Status: COMPLETED | OUTPATIENT
Start: 2024-04-20 | End: 2024-04-20

## 2024-04-20 RX ORDER — RISPERIDONE 1 MG/1
1 TABLET ORAL
Status: DISCONTINUED | OUTPATIENT
Start: 2024-04-20 | End: 2024-04-26 | Stop reason: HOSPADM

## 2024-04-20 RX ORDER — MAGNESIUM HYDROXIDE/ALUMINUM HYDROXICE/SIMETHICONE 120; 1200; 1200 MG/30ML; MG/30ML; MG/30ML
30 SUSPENSION ORAL EVERY 4 HOURS PRN
Status: DISCONTINUED | OUTPATIENT
Start: 2024-04-20 | End: 2024-04-26 | Stop reason: HOSPADM

## 2024-04-20 RX ORDER — LANOLIN ALCOHOL/MO/W.PET/CERES
3 CREAM (GRAM) TOPICAL
Status: DISCONTINUED | OUTPATIENT
Start: 2024-04-20 | End: 2024-04-26 | Stop reason: HOSPADM

## 2024-04-20 RX ORDER — RISPERIDONE 0.25 MG/1
0.25 TABLET ORAL
Status: DISCONTINUED | OUTPATIENT
Start: 2024-04-20 | End: 2024-04-26 | Stop reason: HOSPADM

## 2024-04-20 RX ORDER — RISPERIDONE 0.5 MG/1
0.5 TABLET ORAL
Status: DISCONTINUED | OUTPATIENT
Start: 2024-04-20 | End: 2024-04-26 | Stop reason: HOSPADM

## 2024-04-20 RX ORDER — ACETAMINOPHEN 325 MG/1
975 TABLET ORAL EVERY 6 HOURS PRN
Status: DISCONTINUED | OUTPATIENT
Start: 2024-04-20 | End: 2024-04-21

## 2024-04-20 RX ORDER — LORAZEPAM 2 MG/ML
1 INJECTION INTRAMUSCULAR
Status: DISCONTINUED | OUTPATIENT
Start: 2024-04-20 | End: 2024-04-26 | Stop reason: HOSPADM

## 2024-04-20 RX ADMIN — HYDROXYZINE HYDROCHLORIDE 25 MG: 25 TABLET, FILM COATED ORAL at 22:31

## 2024-04-20 RX ADMIN — ACETAMINOPHEN 650 MG: 325 TABLET ORAL at 19:49

## 2024-04-20 RX ADMIN — PANTOPRAZOLE SODIUM 20 MG: 20 TABLET, DELAYED RELEASE ORAL at 04:51

## 2024-04-20 RX ADMIN — MELATONIN TAB 3 MG 3 MG: 3 TAB at 21:18

## 2024-04-20 RX ADMIN — NICOTINE 21 MG: 21 PATCH, EXTENDED RELEASE TRANSDERMAL at 04:20

## 2024-04-20 RX ADMIN — IBUPROFEN 400 MG: 400 TABLET, FILM COATED ORAL at 04:51

## 2024-04-20 NOTE — EMTALA/ACUTE CARE TRANSFER
[facility] EMERGENCY DEPARTMENT  [address] [address]  Dept: [phone]      EMTALA TRANSFER CONSENT    NAME Mac Wick                                         1965                              MRN [id number]    I have been informed of my rights regarding examination, treatment, and transfer   by Dr. Bettina negro. providers found    Benefits:      Risks:        Consent for Transfer:  I acknowledge that my medical condition has been evaluated and explained to me by the emergency department physician or other qualified medical person and/or my attending physician, who has recommended that I be transferred to the service of  Accepting Physician: Dr Perry at Accepting Facility Name, City & State : \[facility]\"" [address] [place]. The above potential benefits of such transfer, the potential risks associated with such transfer, and the probable risks of not being transferred have been explained to me, and I fully understand them.  The doctor has explained that, in my case, the benefits of transfer outweigh the risks.  I agree to be transferred.    I authorize the performance of emergency medical procedures and treatments upon me in both transit and upon arrival at the receiving facility.  Additionally, I authorize the release of any and all medical records to the receiving facility and request they be transported with me, if possible.  I understand that the safest mode of transportation during a medical emergency is an ambulance and that the Hospital advocates the use of this mode of transport. Risks of traveling to the receiving facility by car, including absence of medical control, life sustaining equipment, such as oxygen, and medical personnel has been explained to me and I fully understand them.    (MAIA CORRECT BOX BELOW)  [  ]  I consent to the stated transfer and to be transported by ambulance/helicopter.  [  ]  I consent to the stated transfer, but refuse transportation by  ambulance and accept full responsibility for my transportation by car.  I understand the risks of non-ambulance transfers and I exonerate the Hospital and its staff from any deterioration in my condition that results from this refusal.    X___________________________________________    DATE  24  TIME________  Signature of patient or legally responsible individual signing on patient behalf           RELATIONSHIP TO PATIENT_________________________          Provider Certification    NAME Mac Wick                                         1965                              MRN [id number]    A medical screening exam was performed on the above named patient.  Based on the examination:    Condition Necessitating Transfer The encounter diagnosis was Encounter for psychiatric assessment.    Patient Condition:      Reason for Transfer:      Transfer Requirements: Facility [address]   Space available and qualified personnel available for treatment as acknowledged by MARTA [phone]  Agreed to accept transfer and to provide appropriate medical treatment as acknowledged by       Dr Perry  Appropriate medical records of the examination and treatment of the patient are provided at the time of transfer   STAFF INITIAL WHEN COMPLETED _______  Transfer will be performed by qualified personnel from [facility]  and appropriate transfer equipment as required, including the use of necessary and appropriate life support measures.    Provider Certification: I have examined the patient and explained the following risks and benefits of being transferred/refusing transfer to the patient/family:         Based on these reasonable risks and benefits to the patient and/or the unborn child(qamar), and based upon the information available at the time of the patient’s examination, I certify that the medical benefits reasonably to be expected from the provision of appropriate medical treatments at another medical facility  outweigh the increasing risks, if any, to the individual’s medical condition, and in the case of labor to the unborn child, from effecting the transfer.    X____________________________________________ DATE 04/20/24        TIME_______      ORIGINAL - SEND TO MEDICAL RECORDS   COPY - SEND WITH PATIENT DURING TRANSFER

## 2024-04-20 NOTE — ED NOTES
Insurance Authorization for admission:   Phone call placed to Marva   Phone number: [phone]  Spoke to Maggie   4 days approved.  Level of care: Inpatient Psych  Review on tbd   Authorization # on admission

## 2024-04-20 NOTE — ED NOTES
Patient is accepted at [facility]    Patient is accepted by Dr. Vicky Brewer    Transportation is arranged with [facility].     Transportation is scheduled for **.   Patient may go to the floor at **.          Nurse report is to be called to [phone] prior to patient transfer.

## 2024-04-20 NOTE — NURSING NOTE
Patient admitted to [facility] from [facility].  Patient admitted via 302 for reported threatening behavior towards his parents that was captured on a ring camera per report. Patient denies this and reported he did not threaten anyone. Patient reports he lives with his significant other/fiancee and she is his . Patient does not want anyone to contact his parents and only signed a release of information for his fiancee.  Patient was calm and cooperative with the admission process. Patient reports he has been weaning off of Methadone under provider direction and was on 3 mg daily. Patient goes to [facility] in [facility] and they are closed today and tomorrow and will have to be contacted on Monday.

## 2024-04-20 NOTE — TREATMENT TEAM
04/20/24 1949   Pain Assessment   Pain Assessment Tool 0-10   Pain Score 6   Pain Location/Orientation Orientation: Upper;Location: Back;Orientation: Bilateral;Location: Hip   Pain Radiating Towards LE   Pain Onset/Description Descriptor: Sore   Effect of Pain on Daily Activities ambulation   Patient's Stated Pain Goal No pain   Hospital Pain Intervention(s) Medication (See MAR)     APAP 650mg given for moderate B/L hip and upper back/neck pain. Will f/u for effectiveness.

## 2024-04-20 NOTE — RESTRAINT FACE TO FACE
"Restraint Face to Face   Mac Wick 58 y.o. male MRN: [id number]  Unit/Bed#: SH 02 Encounter: [id number]      Physical Evaluation Patient agitated, pacing, stating \"it's time for me to go home\" despite having 302 signed and pending placement for   Purpose for Restraints/ Seclusion High risk for self harm, High risk for causing significant disruption of treatment environment , High risk for harm to others, and high risk for flight  Patient's reaction to the intervention agitation  Patient's medical condition stable  Patient's Behavioral condition agitation  Restraints to be Continued        "

## 2024-04-20 NOTE — PLAN OF CARE
Problem: Anxiety  Goal: Anxiety is at manageable level  Description: Interventions:  - Assess and monitor patient's anxiety level.   - Monitor for signs and symptoms (heart palpitations, chest pain, shortness of breath, headaches, nausea, feeling jumpy, restlessness, irritable, apprehensive).   - Collaborate with interdisciplinary team and initiate plan and interventions as ordered.  - [place] patient to unit/surroundings  - Explain treatment plan  - Encourage participation in care  - Encourage verbalization of concerns/fears  - Identify coping mechanisms  - Assist in developing anxiety-reducing skills  - Administer/offer alternative therapies  - Limit or eliminate stimulants  Outcome: Progressing     Problem: Risk for Violence/Aggression Toward Others  Goal: Treatment Goal: Refrain from acts of violence/aggression during length of stay, and demonstrate improved impulse control at the time of discharge  Outcome: Progressing  Goal: Verbalize thoughts and feelings  Description: Interventions:  - Assess and re-assess patient's level of risk, every waking shift  - Engage patient in 1:1 interactions, daily, for a minimum of 15 minutes   - Allow patient to express feelings and frustrations in a safe and non-threatening manner   - Establish rapport/trust with patient   Outcome: Progressing  Goal: Refrain from harming others  Outcome: Progressing  Goal: Refrain from destructive acts on the environment or property  Outcome: Progressing  Goal: Control angry outbursts  Description: Interventions:  - Monitor patient closely, per order  - Ensure early verbal de-escalation  - Monitor prn medication needs  - Set reasonable/therapeutic limits, outline behavioral expectations, and consequences   - Provide a non-threatening milieu, utilizing the least restrictive interventions   Outcome: Progressing  Goal: Attend and participate in unit activities, including therapeutic, recreational, and educational groups  Description:  Interventions:  - Provide therapeutic and educational activities daily, encourage attendance and participation, and document same in the medical record   Outcome: Not Progressing  Goal: Identify appropriate positive anger management techniques  Description: Interventions:  - Offer anger management and coping skills groups   - Staff will provide positive feedback for appropriate anger control  Outcome: Not Progressing

## 2024-04-20 NOTE — ED NOTES
Patient brought to the ED on a 302 petitioned by his mother. Basis of 302 was homicidal ideations and increased aggression towards dad. Per 302, they have video footage of the patient saying he would like to put his girlfriend in a hole and send his father into the ground. He was also heard saying that he could hurt his father with thyroid medication and fomaldehyde.  Patient denies all of this and says they are all crazy and he is ready to go home. Patient is not in agreement to inpatient hospitalization. He was read his rights but does not understand them . Patient is a poor historian and would not cooperate with assessment. It is unclear as to whether he has outpatient. Family says that he does not take his meds. Patient denies everything in the 302 and said people are making things up.   He denies SI, HI, and hallucinations.  he also denies any drug or alcohol use but said he is on methadone for his back pain.

## 2024-04-20 NOTE — ED NOTES
Pts mother called for information on the pt.  Pt does not give permission for status or updates to be released to mother.     Naomi Espinoza RN  04/20/24 3192

## 2024-04-21 PROBLEM — E78.1 HYPERTRIGLYCERIDEMIA: Status: ACTIVE | Noted: 2024-04-21

## 2024-04-21 PROBLEM — F11.20 METHADONE DEPENDENCE (HCC): Status: ACTIVE | Noted: 2024-04-21

## 2024-04-21 PROBLEM — F39 MOOD DISORDER (HCC): Status: ACTIVE | Noted: 2022-08-11

## 2024-04-21 PROBLEM — Z00.8 MEDICAL CLEARANCE FOR PSYCHIATRIC ADMISSION: Status: ACTIVE | Noted: 2022-08-11

## 2024-04-21 LAB
25(OH)D3 SERPL-MCNC: 17.8 NG/ML (ref 30–100)
ANION GAP SERPL CALCULATED.3IONS-SCNC: 10 MMOL/L (ref 4–13)
BASOPHILS # BLD AUTO: 0.03 THOUSANDS/ÂΜL (ref 0–0.1)
BASOPHILS NFR BLD AUTO: 0 % (ref 0–1)
BUN SERPL-MCNC: 11 MG/DL (ref 5–25)
CALCIUM SERPL-MCNC: 8.9 MG/DL (ref 8.4–10.2)
CHLORIDE SERPL-SCNC: 103 MMOL/L (ref 96–108)
CHOLEST SERPL-MCNC: 148 MG/DL
CO2 SERPL-SCNC: 24 MMOL/L (ref 21–32)
CREAT SERPL-MCNC: 0.8 MG/DL (ref 0.6–1.3)
EOSINOPHIL # BLD AUTO: 0.22 THOUSAND/ÂΜL (ref 0–0.61)
EOSINOPHIL NFR BLD AUTO: 2 % (ref 0–6)
ERYTHROCYTE [DISTWIDTH] IN BLOOD BY AUTOMATED COUNT: 13.3 % (ref 11.6–15.1)
EST. AVERAGE GLUCOSE BLD GHB EST-MCNC: 123 MG/DL
FOLATE SERPL-MCNC: 12.6 NG/ML
GFR SERPL CREATININE-BSD FRML MDRD: 99 ML/MIN/1.73SQ M
GLUCOSE P FAST SERPL-MCNC: 105 MG/DL (ref 65–99)
GLUCOSE SERPL-MCNC: 105 MG/DL (ref 65–140)
HBA1C MFR BLD: 5.9 %
HCT VFR BLD AUTO: 41 % (ref 36.5–49.3)
HDLC SERPL-MCNC: 37 MG/DL
HGB BLD-MCNC: 13.8 G/DL (ref 12–17)
IMM GRANULOCYTES # BLD AUTO: 0.03 THOUSAND/UL (ref 0–0.2)
IMM GRANULOCYTES NFR BLD AUTO: 0 % (ref 0–2)
LDLC SERPL CALC-MCNC: 84 MG/DL (ref 0–100)
LYMPHOCYTES # BLD AUTO: 3.96 THOUSANDS/ÂΜL (ref 0.6–4.47)
LYMPHOCYTES NFR BLD AUTO: 43 % (ref 14–44)
MCH RBC QN AUTO: 29.4 PG (ref 26.8–34.3)
MCHC RBC AUTO-ENTMCNC: 33.7 G/DL (ref 31.4–37.4)
MCV RBC AUTO: 87 FL (ref 82–98)
MONOCYTES # BLD AUTO: 0.67 THOUSAND/ÂΜL (ref 0.17–1.22)
MONOCYTES NFR BLD AUTO: 7 % (ref 4–12)
NEUTROPHILS # BLD AUTO: 4.3 THOUSANDS/ÂΜL (ref 1.85–7.62)
NEUTS SEG NFR BLD AUTO: 48 % (ref 43–75)
NONHDLC SERPL-MCNC: 111 MG/DL
NRBC BLD AUTO-RTO: 0 /100 WBCS
PLATELET # BLD AUTO: 237 THOUSANDS/UL (ref 149–390)
PMV BLD AUTO: 8.8 FL (ref 8.9–12.7)
POTASSIUM SERPL-SCNC: 3.9 MMOL/L (ref 3.5–5.3)
RBC # BLD AUTO: 4.7 MILLION/UL (ref 3.88–5.62)
SODIUM SERPL-SCNC: 137 MMOL/L (ref 135–147)
TRIGL SERPL-MCNC: 134 MG/DL
VIT B12 SERPL-MCNC: 262 PG/ML (ref 180–914)
WBC # BLD AUTO: 9.21 THOUSAND/UL (ref 4.31–10.16)

## 2024-04-21 PROCEDURE — 99253 IP/OBS CNSLTJ NEW/EST LOW 45: CPT | Performed by: NURSE PRACTITIONER

## 2024-04-21 PROCEDURE — 82306 VITAMIN D 25 HYDROXY: CPT | Performed by: STUDENT IN AN ORGANIZED HEALTH CARE EDUCATION/TRAINING PROGRAM

## 2024-04-21 PROCEDURE — 80061 LIPID PANEL: CPT | Performed by: STUDENT IN AN ORGANIZED HEALTH CARE EDUCATION/TRAINING PROGRAM

## 2024-04-21 PROCEDURE — 83036 HEMOGLOBIN GLYCOSYLATED A1C: CPT | Performed by: NURSE PRACTITIONER

## 2024-04-21 PROCEDURE — 99223 1ST HOSP IP/OBS HIGH 75: CPT | Performed by: STUDENT IN AN ORGANIZED HEALTH CARE EDUCATION/TRAINING PROGRAM

## 2024-04-21 PROCEDURE — 82746 ASSAY OF FOLIC ACID SERUM: CPT | Performed by: STUDENT IN AN ORGANIZED HEALTH CARE EDUCATION/TRAINING PROGRAM

## 2024-04-21 PROCEDURE — 82607 VITAMIN B-12: CPT | Performed by: STUDENT IN AN ORGANIZED HEALTH CARE EDUCATION/TRAINING PROGRAM

## 2024-04-21 PROCEDURE — 85025 COMPLETE CBC W/AUTO DIFF WBC: CPT | Performed by: STUDENT IN AN ORGANIZED HEALTH CARE EDUCATION/TRAINING PROGRAM

## 2024-04-21 PROCEDURE — 80048 BASIC METABOLIC PNL TOTAL CA: CPT | Performed by: STUDENT IN AN ORGANIZED HEALTH CARE EDUCATION/TRAINING PROGRAM

## 2024-04-21 RX ORDER — IBUPROFEN 400 MG/1
400 TABLET ORAL EVERY 6 HOURS PRN
Status: DISCONTINUED | OUTPATIENT
Start: 2024-04-21 | End: 2024-04-26 | Stop reason: HOSPADM

## 2024-04-21 RX ORDER — IBUPROFEN 600 MG/1
600 TABLET ORAL EVERY 6 HOURS PRN
Status: DISCONTINUED | OUTPATIENT
Start: 2024-04-21 | End: 2024-04-26 | Stop reason: HOSPADM

## 2024-04-21 RX ORDER — ARIPIPRAZOLE 5 MG/1
5 TABLET ORAL DAILY
Status: DISCONTINUED | OUTPATIENT
Start: 2024-04-21 | End: 2024-04-22

## 2024-04-21 RX ORDER — NICOTINE 21 MG/24HR
1 PATCH, TRANSDERMAL 24 HOURS TRANSDERMAL DAILY
Status: DISCONTINUED | OUTPATIENT
Start: 2024-04-21 | End: 2024-04-26 | Stop reason: HOSPADM

## 2024-04-21 RX ORDER — CLONIDINE HYDROCHLORIDE 0.1 MG/1
0.1 TABLET ORAL 2 TIMES DAILY PRN
Status: DISCONTINUED | OUTPATIENT
Start: 2024-04-21 | End: 2024-04-26 | Stop reason: HOSPADM

## 2024-04-21 RX ORDER — METHOCARBAMOL 500 MG/1
500 TABLET, FILM COATED ORAL EVERY 6 HOURS PRN
Status: DISCONTINUED | OUTPATIENT
Start: 2024-04-21 | End: 2024-04-26 | Stop reason: HOSPADM

## 2024-04-21 RX ORDER — PANTOPRAZOLE SODIUM 20 MG/1
20 TABLET, DELAYED RELEASE ORAL
Status: DISCONTINUED | OUTPATIENT
Start: 2024-04-21 | End: 2024-04-26 | Stop reason: HOSPADM

## 2024-04-21 RX ORDER — CHLORAL HYDRATE 500 MG
1000 CAPSULE ORAL DAILY
Status: DISCONTINUED | OUTPATIENT
Start: 2024-04-21 | End: 2024-04-26 | Stop reason: HOSPADM

## 2024-04-21 RX ORDER — IBUPROFEN 400 MG/1
800 TABLET ORAL EVERY 8 HOURS PRN
Status: DISCONTINUED | OUTPATIENT
Start: 2024-04-21 | End: 2024-04-26 | Stop reason: HOSPADM

## 2024-04-21 RX ORDER — LORAZEPAM 1 MG/1
1 TABLET ORAL 2 TIMES DAILY PRN
Status: DISCONTINUED | OUTPATIENT
Start: 2024-04-21 | End: 2024-04-26 | Stop reason: HOSPADM

## 2024-04-21 RX ORDER — FUROSEMIDE 20 MG/1
20 TABLET ORAL DAILY
Status: DISCONTINUED | OUTPATIENT
Start: 2024-04-21 | End: 2024-04-26 | Stop reason: HOSPADM

## 2024-04-21 RX ORDER — GABAPENTIN 300 MG/1
300 CAPSULE ORAL 3 TIMES DAILY
Status: DISCONTINUED | OUTPATIENT
Start: 2024-04-21 | End: 2024-04-26 | Stop reason: HOSPADM

## 2024-04-21 RX ADMIN — GABAPENTIN 300 MG: 300 CAPSULE ORAL at 21:07

## 2024-04-21 RX ADMIN — CLONIDINE HYDROCHLORIDE 0.1 MG: 0.1 TABLET ORAL at 12:37

## 2024-04-21 RX ADMIN — ARIPIPRAZOLE 5 MG: 5 TABLET ORAL at 12:35

## 2024-04-21 RX ADMIN — OMEGA-3 FATTY ACIDS CAP 1000 MG 1000 MG: 1000 CAP at 08:36

## 2024-04-21 RX ADMIN — PANTOPRAZOLE SODIUM 20 MG: 20 TABLET, DELAYED RELEASE ORAL at 08:36

## 2024-04-21 RX ADMIN — GABAPENTIN 300 MG: 300 CAPSULE ORAL at 15:50

## 2024-04-21 RX ADMIN — ACETAMINOPHEN 650 MG: 325 TABLET ORAL at 04:29

## 2024-04-21 RX ADMIN — MELATONIN TAB 3 MG 3 MG: 3 TAB at 21:07

## 2024-04-21 RX ADMIN — ACETAMINOPHEN 975 MG: 325 TABLET ORAL at 08:41

## 2024-04-21 RX ADMIN — FUROSEMIDE 20 MG: 20 TABLET ORAL at 08:42

## 2024-04-21 RX ADMIN — NICOTINE 1 PATCH: 21 PATCH, EXTENDED RELEASE TRANSDERMAL at 08:36

## 2024-04-21 RX ADMIN — IBUPROFEN 800 MG: 400 TABLET ORAL at 12:41

## 2024-04-21 RX ADMIN — LORAZEPAM 1 MG: 2 INJECTION INTRAMUSCULAR; INTRAVENOUS at 07:56

## 2024-04-21 NOTE — NURSING NOTE
Patient was in his room calling out for help and extremely anxious.  Patient reports he had a bad night and poor sleep.  Patient was irritable and had rapid and rambling speech pattern. Patient reports severe anxiety possibly due to his not having his methadone.  Patient requested any medication that can help him feel better. PRN IM Ativan given at 0756 for severe anxiety with relief noted and effectiveness reported by patient.  Patient was then compliant with AM medications given and had 100% of breakfast.  Patient also complained of generalized pain and requested Tylenol for same.  PRN Tylenol given at 0841 with relief noted and effectiveness reported by patient.  Patient denies depression, denies SI, AH or VH. Patient is visible on the unit and social with select peers. Will continue to monitor patient for changes in mood or behavior.

## 2024-04-21 NOTE — NURSING NOTE
[facility] ([phone])  is closed today. Will contact them in the AM for verification of medications.

## 2024-04-21 NOTE — ASSESSMENT & PLAN NOTE
Patient is a long-term active tobacco abuser greater than a pack per day  Patient will use a nicotine patch for his nicotine replacement therapy  Smoking cessation education

## 2024-04-21 NOTE — PLAN OF CARE
Problem: Anxiety  Goal: Anxiety is at manageable level  Description: Interventions:  - Assess and monitor patient's anxiety level.   - Monitor for signs and symptoms (heart palpitations, chest pain, shortness of breath, headaches, nausea, feeling jumpy, restlessness, irritable, apprehensive).   - Collaborate with interdisciplinary team and initiate plan and interventions as ordered.  - [place] patient to unit/surroundings  - Explain treatment plan  - Encourage participation in care  - Encourage verbalization of concerns/fears  - Identify coping mechanisms  - Assist in developing anxiety-reducing skills  - Administer/offer alternative therapies  - Limit or eliminate stimulants  Outcome: Progressing     Problem: Risk for Violence/Aggression Toward Others  Goal: Treatment Goal: Refrain from acts of violence/aggression during length of stay, and demonstrate improved impulse control at the time of discharge  Outcome: Progressing  Goal: Verbalize thoughts and feelings  Description: Interventions:  - Assess and re-assess patient's level of risk, every waking shift  - Engage patient in 1:1 interactions, daily, for a minimum of 15 minutes   - Allow patient to express feelings and frustrations in a safe and non-threatening manner   - Establish rapport/trust with patient   Outcome: Progressing  Goal: Refrain from harming others  Outcome: Progressing  Goal: Refrain from destructive acts on the environment or property  Outcome: Progressing  Goal: Control angry outbursts  Description: Interventions:  - Monitor patient closely, per order  - Ensure early verbal de-escalation  - Monitor prn medication needs  - Set reasonable/therapeutic limits, outline behavioral expectations, and consequences   - Provide a non-threatening milieu, utilizing the least restrictive interventions   Outcome: Progressing  Goal: Attend and participate in unit activities, including therapeutic, recreational, and educational groups  Description:  Interventions:  - Provide therapeutic and educational activities daily, encourage attendance and participation, and document same in the medical record   Outcome: Progressing  Goal: Identify appropriate positive anger management techniques  Description: Interventions:  - Offer anger management and coping skills groups   - Staff will provide positive feedback for appropriate anger control  Outcome: Progressing

## 2024-04-21 NOTE — TREATMENT PLAN
TREATMENT PLAN REVIEW - Behavioral Health Mac Wick 58 y.o. 1965 male MRN: [id number]    [facility]B [facility] Room / Bed: [facility] 643/[facility] 643-02 Encounter: [id number]          Admit Date/Time:  4/20/2024  1:59 PM    Treatment Team:   MD Amador Roche DO Glenn Benitez, RN Samantha Diaz Destiny Bracero Evelyn Renjifo    Diagnosis: Principal Problem:    Mood disorder (HCC)  Active Problems:    Medical clearance for psychiatric admission    Gastroesophageal reflux disease with esophagitis    History of drug abuse (HCC)    Opioid use disorder    Tobacco use    Bilateral lower extremity edema    Hypertriglyceridemia    Methadone dependence (HCC)      Patient Strengths/Assets: ability for insight, capable of independent living, cooperative, motivated, negotiates basic needs, patient is willing to work on problems, reasoning ability    Patient Barriers/Limitations: difficulty adapting, lack of stable employment, limited support system, marital/family conflict, patient is on an involuntary commitment, poor past treatment response, poor physical health, poor self-care    Short Term Goals: decrease in depressive symptoms, decrease in anxiety symptoms, decrease in level of agitation, ability to stay safe on the unit, ability to stay free of restraints, improvement in ability to express basic needs, improvement in insight, improvement in reasoning ability, improvement in self care, sleep improvement, improvement in appetite, mood stabilization, increase in group attendance, increase in socialization with peers on the unit, acceptance of need for psychiatric treatment, acceptance of psychiatric medications    Long Term Goals: improvement in depression, improvement in anxiety, stabilization of mood, free of suicidal thoughts, free of homicidal thoughts, no self abusive behavior, improvement in reasoning ability, improved insight, able to express basic needs,  acceptance of need for psychiatric medications, acceptance of need for psychiatric treatment, acceptance of need for psychiatric follow up after discharge, acceptance of psychiatric diagnosis, adequate self care, adequate sleep, adequate appetite, appropriate interaction with peers, appropriate interaction with family, stable living arrangements upon discharge, establishment of family support upon discharge    Progress Towards Goals: starting psychiatric medications as prescribed, progressing, mood is stabilizing, less agitated    Recommended Treatment: medication management, patient medication education, group therapy, milieu therapy, continued Behavioral Health psychiatric evaluation/assessment process    Treatment Frequency: daily medication monitoring, group and milieu therapy daily, monitoring through interdisciplinary rounds, monitoring through weekly patient care conferences    Expected Discharge Date:  5/1/24    Discharge Plan: discharge to home, referral for outpatient medication management with a psychiatrist, referral for outpatient psychotherapy    Treatment Plan Created/Updated By: Anton Carbajal MD

## 2024-04-21 NOTE — TREATMENT TEAM
04/20/24 2226   Eblla Anxiety Scale   Anxious Mood 2   Tension 2   Fears 0   Insomnia 1   Intellectual 0   Depressed Mood 0   Somatic Complaints: Muscular 1   Somatic Complaints: Sensory 1   Cardiovascular Symptoms 0   Respiratory Symptoms 0   Gastrointestinal Symptoms 0   Genitourinary Symptoms 0   Autonomic Symptoms 1   Behavior at Interview 2   Bella Anxiety Score 10     Atarax 25mg given for mild anxiety.

## 2024-04-21 NOTE — NURSING NOTE
Patient complained of restlessness and anxiety associated with withdrawal and requested medication for same. PRN Clonidine given at 1237 with decrease in withdrawal symptoms and decreased anxiety noted and reported by patient. PRN Clonidine was effective. Patient also complained of generalized all over pain and requested Motrin for same. PRN Motrin given at 1241 with decrease in pain reported and effectiveness noted at this time. .   Patient: JOE MURRY    MR# [id number]    Time of Service: 6/21/2023 10:01 PM     Chief Complaint   Patient presents with   • Abdominal Pain   • Constipation       History of Present Illness    Joe Murry is a 62 year old male who presents to the ED with epigastric to left upper quadrant abdominal pain, chest pain and shortness of breath.  Patient endorses a mild cough that is nonproductive.  He denies fevers or chills.  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  Patient's symptoms have been worsening over the past week.  He states that the pain is worse in his left upper quadrant of his abdomen and is worse when he takes a deep breath.  Patient denies any history of MI, DVT, PE.      History provided by (including independent historians): Patient     Review of Systems- (If patient endorsed symptoms, print will be bolded)    All systems reviewed and are negative unless documented in the HPI.    Nursing Notes reviewed      Past Medical/Surgical History    Past Medical History:   Diagnosis Date   • Diabetes mellitus (CMD)    • Idiopathic chronic venous hypertension of left lower extremity with ulcer and inflammation (CMD) 11/03/2021   • Localized edema 11/03/2021   • Non-healing ulcer (CMD)    • Non-pressure chronic ulcer of left calf with fat layer exposed (CMD) 11/03/2021   • Obesity    • Traumatic brain injury (CMD)        History reviewed. No pertinent surgical history.    Medication list on file    Current Outpatient Medications   Medication Instructions   • acetaminophen (TYLENOL) 325 mg, Oral, 2 TIMES DAILY   • cholecalciferol (VITAMIN D) 1,000 Units, Oral, DAILY   • docusate sodium (COLACE) 100 mg, Oral, 2 TIMES DAILY PRN   • dutasteride (AVODART) 0.5 mg, Oral, DAILY   • fluticasone (FLONASE) 50 MCG/ACT nasal spray 2 sprays, Nasal   • guaifenesin 100 MG/5ML Oral, EVERY 4 HOURS PRN   • lithium 600 mg, Oral, EVERY MORNING   • lithium 900 mg, Oral, AT BEDTIME   •  MAGNESIUM OXIDE PO 1 tablet, Oral, DAILY   • melatonin 9 mg, Oral, NIGHTLY   • metFORMIN (GLUCOPHAGE) 1,000 mg, Oral, 2 TIMES DAILY WITH MEALS, Patient states it was changed by his PCP due to A1C 9.5 was on 01/06/23   • mupirocin (BACTROBAN) 2 % ointment Topical, DAILY, Apply to wounds during dressing changes.  Also apply in both nostrils twice daily for 10 days   • mupirocin (BACTROBAN) 2 % ointment Topical, DAILY, Apply to both nostrils twice daily.   • naproxen sodium (ALEVE) 100 mg, Oral, EVERY 8 HOURS PRN   • OLANZapine (ZYPREXA) 10 mg, Oral, DAILY   • omeprazole (PRILOSEC) 40 mg, Oral, DAILY   • polyethylene glycol (MIRALAX) 17 g, Oral, DAILY PRN       Allergies    ALLERGIES:  No Known Allergies    Family/Social History/Social Determinants of Health    No family history on file.    Social History     Tobacco Use   • Smoking status: Former     Current packs/day: 1.00     Types: Cigarettes   • Smokeless tobacco: Never   Substance Use Topics   • Alcohol use: Not Currently   • Drug use: Not Currently       Social Determinants of Health     Intimate Partner Violence: Not At Risk (10/24/2022)    Intimate Partner Violence    • Social Determinants: Intimate Partner Violence Past Fear: No    • Social Determinants: Intimate Partner Violence Current Fear: No   Social Connections: Not on file   Alcohol Use: Not on file   Tobacco Use: Medium Risk (6/21/2023)    Patient History    • Smoking Tobacco Use: Former    • Smokeless Tobacco Use: Never    • Passive Exposure: Not on file   Financial Resource Strain: Not on file   Stress: Not on file   Physical Activity: Not on file   Food Insecurity: Not on file   Transportation Needs: Not on file   Inadequate Housing: Not on file (8/31/2019)   Depression: Not at risk (10/24/2022)    PHQ-2    • PHQ-2 Score: 0        Physical Exam    Vitals:    06/21/23 1944 06/21/23 2249 06/22/23 0050   BP: (!) 151/83  137/84   Pulse: 100  99   Resp: 18  20   Temp: 99 °F (37.2 °C)     SpO2: 95%  91%    Weight:  99.8 kg (220 lb)    Height: 5' 10\" (1.778 m)         Pulse Oximetry: 95% on room air which is normal (independently reviewed and interpreted by me)    Available triage notes, nursing notes and vital signs reviewed by me.    Constitutional:  Appears well-developed and well-nourished.    Head: Normocephalic and atraumatic.    Nose: Normal.    Mouth/Throat: Oropharynx is clear and moist.    Eyes: Conjunctivae are normal. Pupils are equal, round, and reactive to light. No scleral icterus.    Ears: External ears without gross abnormalities.    Neck: Normal range of motion. No tracheal deviation present.    Cardiovascular: Normal rate, regular rhythm.    Respiratory/Chest: Normal respiratory effort, bilateral breath sounds.    GI: Soft. Not distended. No focal tenderness.     Musculoskeletal: No obvious deformity.    Neurological: Awake and alert, strength and sensation grossly intact, speech is fluent    Skin: Skin is warm and dry, no obvious rash.     Psych: Appropriate mood and affect for condition      Diagnostics:    I have independently reviewed and interpreted the rhythm strip as: Regular rate and rhythm     I have independently reviewed and interpreted the EKG as:     Time: 2359  Rate: 98 bpm  Rhythm: Regular  Axis: Normal  T waves:TWI V4-V6  ST:  No ZACHARY or STD  Overall: Normal sinus rhythm         Laboratory summary     Results for orders placed or performed during the hospital encounter of 06/21/23   Comprehensive Metabolic Panel   Result Value Ref Range    Fasting Status      Sodium 133 (L) 135 - 145 mmol/L    Potassium 4.4 3.4 - 5.1 mmol/L    Chloride 103 97 - 110 mmol/L    Carbon Dioxide 23 21 - 32 mmol/L    Anion Gap 11 7 - 19 mmol/L    Glucose 256 (H) 70 - 99 mg/dL    BUN 6 6 - 20 mg/dL    Creatinine 0.60 (L) 0.67 - 1.17 mg/dL    Glomerular Filtration Rate >90 >=60    BUN/Cr 10 7 - 25    Calcium 8.8 8.4 - 10.2 mg/dL    Bilirubin, Total 0.9 0.2 - 1.0 mg/dL    GOT/AST 18 <=37 Units/L    GPT/ALT 26  <64 Units/L    Alkaline Phosphatase 110 45 - 117 Units/L    Albumin 3.4 (L) 3.6 - 5.1 g/dL    Protein, Total 7.5 6.4 - 8.2 g/dL    Globulin 4.1 (H) 2.0 - 4.0 g/dL    A/G Ratio 0.8 (L) 1.0 - 2.4   Lipase   Result Value Ref Range    Lipase 55 (L) 73 - 393 Units/L   Urinalysis & Reflex Microscopy With Culture If Indicated   Result Value Ref Range    COLOR, URINALYSIS Colorless     APPEARANCE, URINALYSIS Clear     GLUCOSE, URINALYSIS 100 (A) Negative mg/dL    BILIRUBIN, URINALYSIS Negative Negative    KETONES, URINALYSIS Trace (A) Negative mg/dL    SPECIFIC GRAVITY, URINALYSIS 1.009 1.005 - 1.030    OCCULT BLOOD, URINALYSIS Negative Negative    PH, URINALYSIS 6.5 5.0 - 7.0    PROTEIN, URINALYSIS Negative Negative mg/dL    UROBILINOGEN, URINALYSIS 0.2 0.2, 1.0 mg/dL    NITRITE, URINALYSIS Negative Negative    LEUKOCYTE ESTERASE, URINALYSIS Negative Negative   CBC with Automated Differential (performable only)   Result Value Ref Range    WBC 20.3 (H) 4.2 - 11.0 K/mcL    RBC 4.85 4.50 - 5.90 mil/mcL    HGB 13.9 13.0 - 17.0 g/dL    HCT 41.1 39.0 - 51.0 %    MCV 84.7 78.0 - 100.0 fl    MCH 28.7 26.0 - 34.0 pg    MCHC 33.8 32.0 - 36.5 g/dL    RDW-CV 15.2 (H) 11.0 - 15.0 %    RDW-SD 46.6 39.0 - 50.0 fL     (H) 140 - 450 K/mcL    NRBC 0 <=0 /100 WBC    Neutrophil, Percent 84 %    Lymphocytes, Percent 5 %    Mono, Percent 9 %    Eosinophils, Percent 1 %    Basophils, Percent 0 %    Immature Granulocytes 1 %    Absolute Neutrophils 17.0 (H) 1.8 - 7.7 K/mcL    Absolute Lymphocytes 1.1 1.0 - 4.0 K/mcL    Absolute Monocytes 1.7 (H) 0.3 - 0.9 K/mcL    Absolute Eosinophils  0.2 0.0 - 0.5 K/mcL    Absolute Basophils 0.1 0.0 - 0.3 K/mcL    Absolute Immature Granulocytes 0.2 0.0 - 0.2 K/mcL   Lactic Acid Venous With Reflex   Result Value Ref Range    Lactate, Venous 1.5 0.0 - 2.0 mmol/L   TROPONIN I, HIGH SENSITIVITY   Result Value Ref Range    Troponin I, High Sensitivity <4 <77 ng/L       Laboratory results above independently  reviewed and interpreted by me.       Radiography/Imaging    CT ABDOMEN PELVIS W CONTRAST - IV contrast only   Final Result   1.  Markedly distended urinary bladder.   2.  Left lower lobe consolidation/pneumonia with moderate to large   partially loculated pleural effusion.   3.  Nonspecific inflammation and stranding noted within the inferior aspect   of the anterior mediastinum and epicardial fat.   3.  Fluid-filled colonic loops without dilatation or evidence for   obstruction.      Electronically Signed by: ERIC ENGLE M.D.    Signed on: 6/21/2023 9:40 PM    Workstation ID: [id number]      XR CHEST AP OR PA    (Results Pending)       Imaging result above independently reviewed and interpreted by me.       Procedures    Procedures    Critical Care:    Time spent providing Critical Care Services to the patient 0 minutes.     This excludes time spent by the resident/advanced provider and time spent performing separately billable procedures. Most of the time was spent at the bedside of the patient, reevaluating the patient and vital signs, test review, records review, explaining conditions and results to the patient and/or family, as well as speaking to the PMD and/or Consultant(s). Injury/illness exists that impairs one or more organ systems such that there is high probability of imminent or life-threatening deterioration in the patient's condition w/o intervention.    Medical Decision Making including ED Course and Interventions    Assessment:    Joe Murry is a 62 year old male who presents with epigastric pain, shortness of breath, chest pain.       Consideration of multiple diagnoses including but not limited to: Pneumonia, less likely ACS, less likely pericarditis, less likely myocarditis, possibly gastritis, acute cholecystitis, pancreatitis       Comorbidities/chronic illnesses impacting care: History of developmental delay       Explanation of tests and/or treatment considered, ordered or  performed: CMP with hyperglycemia, otherwise unremarkable.  Patient has a history of diabetes.  Troponin within normal limits. This indicates that ACS, myocarditis, or pericarditis are less likely causes of the symptoms.  CBC is concerning with a leukocytosis.  CT of the abdomen pelvis demonstrated a left lower lobe pneumonia.  There is also some inflammation in front of the mediastinum.  I suspect this is secondary to the patient's pneumonia.  Patient treated with ceftriaxone and fluids.  Patient to be admitted for further treatment.  Patient required oxygen for hypoxia on room air.    Orders Placed in the ED  Orders Placed This Encounter   • CT ABDOMEN PELVIS W CONTRAST - IV contrast only   • XR CHEST AP OR PA   • CBC with Automated Differential   • Comprehensive Metabolic Panel   • Lipase   • Urinalysis & Reflex Microscopy With Culture If Indicated   • CBC with Automated Differential (performable only)   • Light Blue Top   • Lactic Acid Venous With Reflex   • TROPONIN I, HIGH SENSITIVITY   • ECG   • Admit to Inpatient   • Blood Culture   • No Isolation   • Oxygen to keep sat >92%   • sodium chloride (NORMAL SALINE) 0.9 % bolus 1,000 mL   • iohexol (OMNIPAQUE 350 INJECT) contrast solution 100 mL   • cefTRIAXone (ROCEPHIN) syringe 1,000 mg   • sodium chloride (NORMAL SALINE) 0.9 % bolus 1,000 mL   • melatonin tablet 9 mg   • DISCONTD: metFORMIN (GLUCOPHAGE) tablet 500 mg   • OLANZapine (ZyPREXA) tablet 10 mg   • lithium (ESKALITH) ER tablet 900 mg   • metFORMIN (GLUCOPHAGE) tablet 500 mg       Reassessment         External records reviewed and documented as above.    Care affected by social determinants of health as documented above.    I personally considered admission/escalation of hospital level care vs discharge vs other disposition from the ED.    Disposition  Inpatient admission    I have discussed the patient's presentation with other health care provider: Dr. Hall with Best practices    FINAL CLINICAL  IMPRESSION    1. Pneumonia of left lower lobe due to infectious organism    2. Leukocytosis, unspecified type    3. Hypoxia        6/22/2023  MD Tamiko Clifton Benjamin J, MD  06/22/23 0317
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
## 2024-04-21 NOTE — TREATMENT TEAM
04/21/24 0429   Pain Assessment   Pain Assessment Tool 0-10   Pain Score 6   Pain Location/Orientation Location: Back;Location: Neck   Pain Onset/Description Onset: Ongoing   Effect of Pain on Daily Activities sleep   Patient's Stated Pain Goal No pain   Hospital Pain Intervention(s) Medication (See MAR)   Multiple Pain Sites No     APAP 650mg given for moderate pain

## 2024-04-21 NOTE — ASSESSMENT & PLAN NOTE
Patient takes Lovaza 2 g p.o. twice daily at home   While he is inpatient in the [facility] he will take fish oil 1000 mg p.o twice daily

## 2024-04-21 NOTE — TREATMENT TEAM
Patient's medication reconciliation completed and Dr Ayers notified of same at this time.  Patient reports he is on Methadone 3 mg which has to be verified by his provider at [facility] in [place] before it can be ordered.  [facility] is closed on Sunday and it will have to be verified on Monday morning. Patient's pharmacy was closed at this time ([facility]) [phone] and will be contacted in the AM if open on Sunday and if not on Monday morning as well.

## 2024-04-21 NOTE — ASSESSMENT & PLAN NOTE
Vital signs stable afebrile patient seen and examined by myself today labs from today were reviewed by myself sodium 137 potassium 3.9 creatinine 0.8  Patient medically stable for admit  SL IM will sign off please call with any questions or concerns

## 2024-04-21 NOTE — ASSESSMENT & PLAN NOTE
Patient will continue to take his outpatient PPI he takes Prilosec at home here he will take Protonix 20 mg p.o. daily

## 2024-04-21 NOTE — ASSESSMENT & PLAN NOTE
Patient takes methadone daily chart is inconsistent 1 place it says 150 mg p.o. daily another place that says he is weaned down to 3 mg p.o. daily  He gets his [facility] in [place], this will need to be verified tomorrow.

## 2024-04-22 PROBLEM — S22.009A THORACIC SPINE FRACTURE (HCC): Chronic | Status: ACTIVE | Noted: 2022-06-09

## 2024-04-22 PROBLEM — L72.0 EPITHELIAL INCLUSION CYST: Status: ACTIVE | Noted: 2018-07-11

## 2024-04-22 PROBLEM — J98.4 PNEUMONITIS: Status: ACTIVE | Noted: 2018-01-10

## 2024-04-22 PROBLEM — R31.9 HEMATURIA: Status: ACTIVE | Noted: 2018-05-04

## 2024-04-22 PROBLEM — Z98.890 S/P LEFT KNEE ARTHROSCOPY: Status: ACTIVE | Noted: 2020-11-17

## 2024-04-22 PROBLEM — J44.9 COPD (CHRONIC OBSTRUCTIVE PULMONARY DISEASE) (HCC): Status: ACTIVE | Noted: 2022-06-07

## 2024-04-22 PROBLEM — S83.249A MEDIAL MENISCUS TEAR: Status: ACTIVE | Noted: 2020-11-06

## 2024-04-22 PROBLEM — L72.0 EPIDERMOID CYST OF NECK: Status: ACTIVE | Noted: 2018-05-15

## 2024-04-22 PROBLEM — R13.12 OROPHARYNGEAL DYSPHAGIA: Status: ACTIVE | Noted: 2022-06-09

## 2024-04-22 LAB — GLUCOSE SERPL-MCNC: 129 MG/DL (ref 65–140)

## 2024-04-22 PROCEDURE — 0HBRXZZ EXCISION OF TOE NAIL, EXTERNAL APPROACH: ICD-10-PCS | Performed by: STUDENT IN AN ORGANIZED HEALTH CARE EDUCATION/TRAINING PROGRAM

## 2024-04-22 PROCEDURE — 99232 SBSQ HOSP IP/OBS MODERATE 35: CPT | Performed by: STUDENT IN AN ORGANIZED HEALTH CARE EDUCATION/TRAINING PROGRAM

## 2024-04-22 PROCEDURE — 82948 REAGENT STRIP/BLOOD GLUCOSE: CPT

## 2024-04-22 RX ORDER — CYANOCOBALAMIN 1000 UG/ML
1000 INJECTION, SOLUTION INTRAMUSCULAR; SUBCUTANEOUS ONCE
Status: COMPLETED | OUTPATIENT
Start: 2024-04-22 | End: 2024-04-22

## 2024-04-22 RX ORDER — ARIPIPRAZOLE 15 MG/1
7.5 TABLET ORAL DAILY
Status: DISCONTINUED | OUTPATIENT
Start: 2024-04-23 | End: 2024-04-24

## 2024-04-22 RX ORDER — ERGOCALCIFEROL 1.25 MG/1
50000 CAPSULE ORAL WEEKLY
Status: DISCONTINUED | OUTPATIENT
Start: 2024-04-22 | End: 2024-04-26 | Stop reason: HOSPADM

## 2024-04-22 RX ORDER — FOLIC ACID 1 MG/1
1 TABLET ORAL DAILY
Status: DISCONTINUED | OUTPATIENT
Start: 2024-04-22 | End: 2024-04-26 | Stop reason: HOSPADM

## 2024-04-22 RX ORDER — METHADONE HYDROCHLORIDE 10 MG/ML
2.5 CONCENTRATE ORAL DAILY
Status: DISCONTINUED | OUTPATIENT
Start: 2024-04-22 | End: 2024-04-26 | Stop reason: HOSPADM

## 2024-04-22 RX ADMIN — GABAPENTIN 300 MG: 300 CAPSULE ORAL at 08:16

## 2024-04-22 RX ADMIN — IBUPROFEN 800 MG: 400 TABLET ORAL at 08:32

## 2024-04-22 RX ADMIN — FOLIC ACID 1 MG: 1 TABLET ORAL at 08:46

## 2024-04-22 RX ADMIN — NICOTINE 1 PATCH: 21 PATCH, EXTENDED RELEASE TRANSDERMAL at 08:16

## 2024-04-22 RX ADMIN — FUROSEMIDE 20 MG: 20 TABLET ORAL at 08:16

## 2024-04-22 RX ADMIN — GABAPENTIN 300 MG: 300 CAPSULE ORAL at 15:43

## 2024-04-22 RX ADMIN — METHADONE HYDROCHLORIDE 2.5 MG: 10 CONCENTRATE ORAL at 11:48

## 2024-04-22 RX ADMIN — CYANOCOBALAMIN 1000 MCG: 1000 INJECTION INTRAMUSCULAR; SUBCUTANEOUS at 08:46

## 2024-04-22 RX ADMIN — PANTOPRAZOLE SODIUM 20 MG: 20 TABLET, DELAYED RELEASE ORAL at 06:16

## 2024-04-22 RX ADMIN — OMEGA-3 FATTY ACIDS CAP 1000 MG 1000 MG: 1000 CAP at 08:16

## 2024-04-22 RX ADMIN — MELATONIN TAB 3 MG 3 MG: 3 TAB at 21:15

## 2024-04-22 RX ADMIN — ARIPIPRAZOLE 5 MG: 5 TABLET ORAL at 08:16

## 2024-04-22 RX ADMIN — GABAPENTIN 300 MG: 300 CAPSULE ORAL at 21:15

## 2024-04-22 RX ADMIN — ERGOCALCIFEROL 50000 UNITS: 1.25 CAPSULE, LIQUID FILLED ORAL at 08:46

## 2024-04-22 NOTE — PROGRESS NOTES
04/22/24 1130   Referral Data   Referral Source Other (Comment)  ([facility])   Referral Reason Psych   County Information   County of Residence [facility]   Readmission Root Cause   30 Day Readmission No   Patient Information   Mental Status Alert   Primary Caregiver Self   Support System Immediate family;Extended family;Friends   Zoroastrianism/Cultural Requests Bahai   Legal Information   Tx Plan Signed Yes   Current Status: 201   Legal Issues Denied   Activities of Daily Living Prior to Admission   Functional Status Independent   Assistive Device Straight Cane   Living Arrangement House;Lives with someone   Ambulation Independent   Access to Firearms   Access to Firearms No   Income Information   Income Source SSI/SSD   Means of Transportation   Means of Transport to Appts: Drives Self

## 2024-04-22 NOTE — PLAN OF CARE
Problem: Anxiety  Goal: Anxiety is at manageable level  Description: Interventions:  - Assess and monitor patient's anxiety level.   - Monitor for signs and symptoms (heart palpitations, chest pain, shortness of breath, headaches, nausea, feeling jumpy, restlessness, irritable, apprehensive).   - Collaborate with interdisciplinary team and initiate plan and interventions as ordered.  - Erwin patient to unit/surroundings  - Explain treatment plan  - Encourage participation in care  - Encourage verbalization of concerns/fears  - Identify coping mechanisms  - Assist in developing anxiety-reducing skills  - Administer/offer alternative therapies  - Limit or eliminate stimulants  Outcome: Progressing     Problem: Risk for Violence/Aggression Toward Others  Goal: Treatment Goal: Refrain from acts of violence/aggression during length of stay, and demonstrate improved impulse control at the time of discharge  Outcome: Progressing  Goal: Verbalize thoughts and feelings  Description: Interventions:  - Assess and re-assess patient's level of risk, every waking shift  - Engage patient in 1:1 interactions, daily, for a minimum of 15 minutes   - Allow patient to express feelings and frustrations in a safe and non-threatening manner   - Establish rapport/trust with patient   Outcome: Progressing  Goal: Refrain from harming others  Outcome: Progressing  Goal: Refrain from destructive acts on the environment or property  Outcome: Progressing  Goal: Control angry outbursts  Description: Interventions:  - Monitor patient closely, per order  - Ensure early verbal de-escalation  - Monitor prn medication needs  - Set reasonable/therapeutic limits, outline behavioral expectations, and consequences   - Provide a non-threatening milieu, utilizing the least restrictive interventions   Outcome: Progressing  Goal: Attend and participate in unit activities, including therapeutic, recreational, and educational groups  Description:  Interventions:  - Provide therapeutic and educational activities daily, encourage attendance and participation, and document same in the medical record   Outcome: Progressing  Goal: Identify appropriate positive anger management techniques  Description: Interventions:  - Offer anger management and coping skills groups   - Staff will provide positive feedback for appropriate anger control  Outcome: Progressing

## 2024-04-22 NOTE — PROGRESS NOTES
04/22/24 1127   Housing Stability   In the last 12 months, was there a time when you were not able to pay the mortgage or rent on time? N   In the last 12 months, how many places have you lived? 1   In the last 12 months, was there a time when you did not have a steady place to sleep or slept in a shelter (including now)? N   Transportation Needs   In the past 12 months, has lack of transportation kept you from medical appointments or from getting medications? no   In the past 12 months, has lack of transportation kept you from meetings, work, or from getting things needed for daily living? No   Food Insecurity   Within the past 12 months, you worried that your food would run out before you got the money to buy more. Never true   Within the past 12 months, the food you bought just didn't last and you didn't have money to get more. Never true   Intimate Partner Violence   Within the last year, have you been afraid of your partner or ex-partner? No   Within the last year, have you been humiliated or emotionally abused in other ways by your partner or ex-partner? No   Within the last year, have you been kicked, hit, slapped, or otherwise physically hurt by your partner or ex-partner? No   Within the last year, have you been raped or forced to have any kind of sexual activity by your partner or ex-partner? No   Utilities   In the past 12 months has the electric, gas, oil, or water company threatened to shut off services in your home? Yes

## 2024-04-22 NOTE — NURSING NOTE
Patient's methadone dose verified with [facility] as 3 mg PO daily.  Dr Jose notified and new order received.  Patient denies depression but does report anxiety related to his current situation.  Patient is visible and social with peers on the unit. Patient denies SI, AH or VH. Will continue to monitor patient for changes in mood or behavior.

## 2024-04-22 NOTE — NURSING NOTE
Medications verified with [facility] include Lasix, Effexor, Vitamin D 50,000, Omega 3 Fish Oil and Omeprazole.  Patient reports he was not taking the Effexor even though he has it at home.
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
## 2024-04-22 NOTE — PLAN OF CARE
Pt attends group and participates.    Problem: Risk for Violence/Aggression Toward Others  Goal: Attend and participate in unit activities, including therapeutic, recreational, and educational groups  Description: Interventions:  - Provide therapeutic and educational activities daily, encourage attendance and participation, and document same in the medical record   Outcome: Progressing

## 2024-04-22 NOTE — CONSULTS
Consult Note- Podiatry   [facility]  Mac Wick 58 y.o. male MRN: [id number]  Unit/Bed#: [facility] 643-02 Encounter: [id number]    Assessment/Plan     Assessment:  1. Onychomycosis x 10  2. PVD of bilateral feet  3. Pain toes b/l  4.  Venous insufficiency with edema of bilateral lower extremities     Plan:  - d/w pt that discomfort is secondary to toe nail thickening  - Hallucal mycotic nails x2 debrided decreasing thickness by 1 mm. Mycotic toe nails 2-5 b/l were trimmed, decreasing length, without incidence utilizing a sharp nail nipper.  -Recommend compression stockings to bilateral lower extremities for edema control  - All questions and concerns addressed.    - Podiatry signing off, thank you for the consult.     History of Present Illness     HPI:  Mac Wick is a 58 y.o. male who presents with painful, elongated toenails and lower extremity edema with a history of venous ulcerations.  Patient denies any open ulcerations at this time.  They state that they see Dr. Castaneda outpatient. They have difficulty applying their socks and shoes due to the elongation of the nails. The pressure within their shoe gear is painful and they have been unable to cut their nails adequately. Patient states pain is 1/10 in shoe gear. Pain with pressure. Requires at risk foot care.     Inpatient consult to Podiatry  Consult performed by: Kiko Claudio DPM  Consult ordered by: Karlie Jose MD        Review of Systems   Constitutional: Negative.    HENT: Negative.    Eyes: Negative.    Respiratory: Negative.    Cardiovascular: Negative.    Gastrointestinal: Negative.    Musculoskeletal: Negative   Skin: elongated thickened toenails, venous insufficiency stasis changes  Neurological: Negative.        Historical Information   Past Medical History:   Diagnosis Date    Anxiety     Arthritis     Cancer (HCC) 2017    skin cancer    Closed T2 fracture (HCC) 5/4/2022    COPD (chronic obstructive pulmonary  disease) (HCC)     Depression     Diverticulitis of colon     Establishing care with new doctor, encounter for 8/11/2022    GERD (gastroesophageal reflux disease)     Infectious viral hepatitis     Pneumonia     Shingles      Past Surgical History:   Procedure Laterality Date    APPENDECTOMY      HIP SURGERY Left 2015    KNEE SURGERY Left 2020    orthoscopic    SPINE SURGERY  06/13/2022     Social History   Social History     Substance and Sexual Activity   Alcohol Use Yes    Comment: a few days ago     Social History     Substance and Sexual Activity   Drug Use Yes    Types: Marijuana, Methamphetamines    Comment: methadone     Social History     Tobacco Use   Smoking Status Every Day    Current packs/day: 0.50    Average packs/day: 0.5 packs/day for 46.3 years (23.2 ttl pk-yrs)    Types: Cigarettes, Pipe    Start date: 1978   Smokeless Tobacco Former    Types: Chew     Family History:   Family History   Problem Relation Age of Onset    Cancer Mother     Depression Mother     Hyperlipidemia Mother     Thyroid disease Mother     Cancer Father     COPD Father     Hypertension Father     Post-traumatic stress disorder Father     Diabetes Sister     Hepatitis Sister     Cancer Sister     Hepatitis Sister     Mental illness Sister        Meds/Allergies   Medications Prior to Admission   Medication    ergocalciferol (VITAMIN D2) 50,000 units    furosemide (LASIX) 20 mg tablet    omeprazole (PriLOSEC) 20 mg delayed release capsule    Adalimumab (Humira Pen) 40 MG/0.4ML PNKT    ARIPiprazole (ABILIFY) 10 mg tablet    methadone (DOLOPHINE) 10 MG/5ML solution    methocarbamol (ROBAXIN) 750 mg tablet    omega-3-acid ethyl esters (LOVAZA) 1 g capsule    venlafaxine (EFFEXOR-XR) 75 mg 24 hr capsule     Allergies   Allergen Reactions    Penicillins Anaphylaxis, Rash and Throat Swelling       Objective   First Vitals:   Blood Pressure: 168/91 (04/20/24 1415)  Pulse: 81 (04/20/24 1415)  Temperature: 97.8 °F (36.6 °C) (04/20/24  "1415)  Temp Source: Temporal (04/20/24 1600)  Respirations: 18 (04/20/24 1415)  Height: 5' 8\" (172.7 cm) (04/20/24 1415)  Weight - Scale: 117 kg (257 lb 11.2 oz) (04/20/24 1415)  SpO2: 97 % (04/20/24 1554)    Current Vitals:   Blood Pressure: 168/90 (04/22/24 1516)  Pulse: 89 (04/22/24 1516)  Temperature: 98 °F (36.7 °C) (04/22/24 1516)  Temp Source: Temporal (04/22/24 1516)  Respirations: 17 (04/22/24 1516)  Height: 5' 8\" (172.7 cm) (04/20/24 1415)  Weight - Scale: 124 kg (273 lb 11.2 oz) (04/21/24 0846)  SpO2: 96 % (04/22/24 1516)    /90 (BP Location: Right arm)   Pulse 89   Temp 98 °F (36.7 °C) (Temporal)   Resp 17   Ht 5' 8\" (1.727 m)   Wt 124 kg (273 lb 11.2 oz)   SpO2 96%   BMI 41.62 kg/m²     General Appearance:    Alert, cooperative, no distress            Extremities:   MMT is 5/5 to all compartments of the LE, +1/4 edema B/L, Digital ROM is intact,    Pulses:   R DP is +1/4, R PT is +0/4, L DP is +1/4, L PT is +0/4, CFT< 3sec to all digits. Thin/shiny skin noted to the B/L LE, pigmentary changes to B/L LE. Absent digital hair growth b/l.    Skin:   Nail thickening b/l. Nails are yellow, discolored, thickened, elongated, with notable subungual debris and > 2 mm thickness noted to toenails 1-5 B/L. No open Lesions.  Chronic venous stasis changes with hemosiderosis bilateral legs.       Neurologic:   Normal strength, sensation and reflexes       Throughout. Gross sensation is intact. Protective sensation is intact.               Lab Results:   Admission on 04/20/2024   Component Date Value    Sodium 04/21/2024 137     Potassium 04/21/2024 3.9     Chloride 04/21/2024 103     CO2 04/21/2024 24     ANION GAP 04/21/2024 10     BUN 04/21/2024 11     Creatinine 04/21/2024 0.80     Glucose 04/21/2024 105     Glucose, Fasting 04/21/2024 105 (H)     Calcium 04/21/2024 8.9     eGFR 04/21/2024 99     WBC 04/21/2024 9.21     RBC 04/21/2024 4.70     Hemoglobin 04/21/2024 13.8     Hematocrit 04/21/2024 41.0  "    MCV 04/21/2024 87     MCH 04/21/2024 29.4     MCHC 04/21/2024 33.7     RDW 04/21/2024 13.3     MPV 04/21/2024 8.8 (L)     Platelets 04/21/2024 237     nRBC 04/21/2024 0     Segmented % 04/21/2024 48     Immature Grans % 04/21/2024 0     Lymphocytes % 04/21/2024 43     Monocytes % 04/21/2024 7     Eosinophils Relative 04/21/2024 2     Basophils Relative 04/21/2024 0     Absolute Neutrophils 04/21/2024 4.30     Absolute Immature Grans 04/21/2024 0.03     Absolute Lymphocytes 04/21/2024 3.96     Absolute Monocytes 04/21/2024 0.67     Eosinophils Absolute 04/21/2024 0.22     Basophils Absolute 04/21/2024 0.03     Folate 04/21/2024 12.6     Cholesterol 04/21/2024 148     Triglycerides 04/21/2024 134     HDL, Direct 04/21/2024 37 (L)     LDL Calculated 04/21/2024 84     Non-HDL-Chol (CHOL-HDL) 04/21/2024 111     Vitamin B-12 04/21/2024 262     Vit D, 25-Hydroxy 04/21/2024 17.8 (L)     Hemoglobin A1C 04/21/2024 5.9 (H)     EAG 04/21/2024 123     POC Glucose 04/22/2024 129      Imaging: I have personally reviewed pertinent films in PACS  EKG, Pathology, and Other Studies: I have personally reviewed pertinent reports.      Code Status: Level 1 - Full Code  Advance Directive and Living Will:      Power of :

## 2024-04-22 NOTE — PROGRESS NOTES
"Progress Note - Behavioral Health   Mac Wick 58 y.o. male MRN: [id number]  Unit/Bed#: [facility] 643-02 Encounter: [id number]    Assessment/Plan   Principal Problem:    Mood disorder (HCC)  Active Problems:    Medical clearance for psychiatric admission    Gastroesophageal reflux disease with esophagitis    History of drug abuse (HCC)    Opioid use disorder    Tobacco use    Bilateral lower extremity edema    Hypertriglyceridemia    Methadone dependence ([facility])      Recommended Treatment:   Increased Abilify to 7.5mg daily  Continue gabapentin 300mg 3 times daily for anxiety and chronic pain  Restarted methadone oral solution 2.5mg daily after verifying with methadone clinic  Patient signed 201.  Discharge planning continue; tentative Friday  Collateral information from Sharlene completed; patient consented and agreed with collateral from long Su but not mother nor sister.    Continue with group therapy, milieu therapy and occupational therapy.    Continue frequent safety checks and vitals per unit protocol.  Case discussed with treatment team.  Continue with [facility] medical management as indicated  Continue coordinating with case management regarding disposition  Risks, benefits and possible side effects of Medications: Risks, benefits, and possible side effects of medications have been explained to the patient, who verbalizes understanding    ------------------------------------------------------------    Subjective: Per nursing report, Mac has been cooperative on the unit and compliant with medications.      Today, Mac is consenting for safety on the unit. He reports feeling \"better.\" Mac notes having good sleep. Mac states having a good appetite. Mac has been taking the medications as prescribed and reporting no side effects.    Mac no suicidal ideations. Mac no homicidal ideations. Regarding hallucinations, Mac describes no hallucinations.    Collateral information from Sharlene, " "Kd taken 4/22:  Patient was detoxing and became anxious and when going to [facility] house unclear the reason why and then returned back to his home for 4-5 days and kd saw no abnormalities of behavior. He was watching television and  eating ice cream when the  came for involuntary admission. Sister has history of mental illness and retirement time and have 8  children. Sister went behind his back and  had her sign the form to have him be admitted to crisis center. Sister also called state police against the mother. Psychiatrist seen for unknown reasons but for methadone. He is on disability for injury for neck and back injury 2 years. He  smokes pack to 2 packs a day. Social drinker. Kd have been living with patient for 8 years and has not seen any significant abnormal behaviors. Benjaminjamesmoises did notice arguments between patient and sister but not clinically significant degree.    PRNs overnight: ibuprofen this morning,    VS: Reviewed, within normal limits    Progress Toward Goals: improvement    Psychiatric Review of Systems:  Behavior over the last 24 hours: improved  Sleep: normal  Appetite: adequate  Medication side effects: none verbalized  ROS: Complete review of systems is negative except as noted above.    Vital signs in last 24 hours:  Temp:  [97.5 °F (36.4 °C)-98.3 °F (36.8 °C)] 97.9 °F (36.6 °C)  HR:  [78-87] 84  Resp:  [16-18] 17  BP: (134-150)/(65-81) 134/81    Mental Status Exam:  Appearance:  age appropriate, casually dressed, dressed appropriately, looks stated age, bearded   Behavior:  normal, pleasant, cooperative, calm   Speech:  normal rate and volume, coherent, pressured   Mood:  \"good\"   Affect:  reactive, slightly brighter   Thought Process:  tangential   Associations: concrete associations   Thought Content:  no overt delusions   Perceptual Disturbances: Denies auditory or visual hallucinations and Does not appear to be responding to internal stimuli   Risk Potential: Suicidal ideation " - None  Homicidal ideation - None at present  Potential for aggression - No longer agitated   Sensorium:  oriented to person, place, and time/date   Memory:  recent and remote memory grossly intact   Consciousness:  alert and awake   Attention/Concentration: attention span and concentration are age appropriate   Insight:  limited   Judgment: limited   Gait/Station: uses walker   Motor Activity: no abnormal movements     Current Medications:  Current Facility-Administered Medications   Medication Dose Route Frequency Provider Last Rate    aluminum-magnesium hydroxide-simethicone  30 mL Oral Q4H PRN Liza Perry MD      [START ON 4/23/2024] ARIPiprazole  7.5 mg Oral Daily Karlie Jose MD      cloNIDine  0.1 mg Oral BID PRN Anton Carbajal MD      [START ON 4/23/2024] cyanocobalamin  1,000 mcg Oral Daily [facility], KIMBERLYNP      ergocalciferol  50,000 Units Oral Weekly [facility], CRNP      fish oil  1,000 mg Oral Daily [facility], KIMBERLYNP      folic acid  1 mg Oral Daily [facility], KIMBERLYNP      furosemide  20 mg Oral Daily [facility], CRNP      gabapentin  300 mg Oral TID Anton Carbajal MD      haloperidol lactate  5 mg Intramuscular Q4H PRN Max 4/day Liza Perry MD      hydrOXYzine HCL  25 mg Oral Q6H PRN Max 4/day Liza Perry MD      ibuprofen  400 mg Oral Q6H PRN Anton Carbajal MD      ibuprofen  600 mg Oral Q6H PRN Anton Carbajal MD      ibuprofen  800 mg Oral Q8H PRN Anton Carbajal MD      LORazepam  1 mg Intramuscular Q6H PRN Max 3/day Liza Perry MD      LORazepam  1 mg Oral BID PRN Anton Carbajal MD      melatonin  3 mg Oral HS Liza Perry MD      methadone  2.5 mg Oral Daily Karlie Jose MD      methocarbamol  500 mg Oral Q6H PRN [facility], DIA      nicotine  1 patch Transdermal Daily [facility], DIA      nicotine polacrilex  4 mg Oral Q2H PRN Liza Perry MD       pantoprazole  20 mg Oral Early Morning DIA Amaya      risperiDONE  0.25 mg Oral Q4H PRN Max 6/day Liza Perry MD      risperiDONE  0.5 mg Oral Q4H PRN Max 3/day Liza Perry MD      risperiDONE  1 mg Oral Q2H PRN Max 3/day Liza Perry MD         Behavioral Health Medications: all current active meds have been reviewed. Changes as in plan section above.    Laboratory results:  I have personally reviewed all pertinent laboratory/tests results.  Recent Results (from the past 48 hour(s))   Basic metabolic panel    Collection Time: 04/21/24  4:44 AM   Result Value Ref Range    Sodium 137 135 - 147 mmol/L    Potassium 3.9 3.5 - 5.3 mmol/L    Chloride 103 96 - 108 mmol/L    CO2 24 21 - 32 mmol/L    ANION GAP 10 4 - 13 mmol/L    BUN 11 5 - 25 mg/dL    Creatinine 0.80 0.60 - 1.30 mg/dL    Glucose 105 65 - 140 mg/dL    Glucose, Fasting 105 (H) 65 - 99 mg/dL    Calcium 8.9 8.4 - 10.2 mg/dL    eGFR 99 ml/min/1.73sq m   CBC and differential    Collection Time: 04/21/24  4:44 AM   Result Value Ref Range    WBC 9.21 4.31 - 10.16 Thousand/uL    RBC 4.70 3.88 - 5.62 Million/uL    Hemoglobin 13.8 12.0 - 17.0 g/dL    Hematocrit 41.0 36.5 - 49.3 %    MCV 87 82 - 98 fL    MCH 29.4 26.8 - 34.3 pg    MCHC 33.7 31.4 - 37.4 g/dL    RDW 13.3 11.6 - 15.1 %    MPV 8.8 (L) 8.9 - 12.7 fL    Platelets 237 149 - 390 Thousands/uL    nRBC 0 /100 WBCs    Segmented % 48 43 - 75 %    Immature Grans % 0 0 - 2 %    Lymphocytes % 43 14 - 44 %    Monocytes % 7 4 - 12 %    Eosinophils Relative 2 0 - 6 %    Basophils Relative 0 0 - 1 %    Absolute Neutrophils 4.30 1.85 - 7.62 Thousands/µL    Absolute Immature Grans 0.03 0.00 - 0.20 Thousand/uL    Absolute Lymphocytes 3.96 0.60 - 4.47 Thousands/µL    Absolute Monocytes 0.67 0.17 - 1.22 Thousand/µL    Eosinophils Absolute 0.22 0.00 - 0.61 Thousand/µL    Basophils Absolute 0.03 0.00 - 0.10 Thousands/µL   Folate    Collection Time: 04/21/24  4:44 AM   Result Value Ref Range     Folate 12.6 >5.9 ng/mL   Lipid panel    Collection Time: 04/21/24  4:44 AM   Result Value Ref Range    Cholesterol 148 See Comment mg/dL    Triglycerides 134 See Comment mg/dL    HDL, Direct 37 (L) >=40 mg/dL    LDL Calculated 84 0 - 100 mg/dL    Non-HDL-Chol (CHOL-HDL) 111 mg/dl   Vitamin B12    Collection Time: 04/21/24  4:44 AM   Result Value Ref Range    Vitamin B-12 262 180 - 914 pg/mL   Vitamin D 25 hydroxy    Collection Time: 04/21/24  4:44 AM   Result Value Ref Range    Vit D, 25-Hydroxy 17.8 (L) 30.0 - 100.0 ng/mL   Hemoglobin A1C    Collection Time: 04/21/24  4:44 AM   Result Value Ref Range    Hemoglobin A1C 5.9 (H) Normal 4.0-5.6%; PreDiabetic 5.7-6.4%; Diabetic >=6.5%; Glycemic control for adults with diabetes <7.0% %     mg/dl   Fingerstick Glucose (POCT)    Collection Time: 04/22/24  7:40 AM   Result Value Ref Range    POC Glucose 129 65 - 140 mg/dl        This note has been constructed using a voice recognition system. There may be translation, syntax, or grammatical errors. If you have any questions, please contact the dictating author.    Dominic Stahl MD
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
## 2024-04-22 NOTE — PROGRESS NOTES
Pt attended all groups.  Pt visible and needs prompting at times for focus and cross talk with peer. Pt did journal and reflect.     04/22/24 1330   Activity/Group Checklist   Group Other (Comment)  (Mindfulness and journaling)   Attendance Attended;Other (Comment)  (10 min late)   Attendance Duration (min) 46-60   Interactions Other (Comment)  (pt focused on other things, journaling, talking to peers)   Affect/Mood Incongruent   Goals Achieved Identified feelings;Increased hopefulness;Discussed coping strategies;Discussed self-esteem issues;Able to manage/cope with feelings;Able to reflect/comment on own behavior;Able to engage in interactions;Able to listen to others;Able to recieve feedback

## 2024-04-22 NOTE — PROGRESS NOTES
04/22/24 1130   Team Meeting   Meeting Type Tx Team Meeting   Initial Conference Date 04/22/24   Team Members Present   Team Members Present Physician;Nurse;   Physician Team Member Dr. Jose   Nursing Team Member Carlton   Care Management Team Member Ban   Patient/Family Present   Patient Present Yes   Patient's Family Present No     Treatment plan reviewed with the patient; patient in agreement and signed. A copy of the treatment plan was put into the patient's discharge folder and the original was put in for scanning.
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
## 2024-04-22 NOTE — NURSING NOTE
"Pt present on the unit. Out of room throughout the evening hours and observed interacting and playing cards with peers. On approach, pt stated \"I'm holding on well\". Pt expressed improvement in his overall mental being today. No behaviors noted thus far. Medication compliant and Q7 maintained.   "

## 2024-04-23 PROCEDURE — 99232 SBSQ HOSP IP/OBS MODERATE 35: CPT | Performed by: STUDENT IN AN ORGANIZED HEALTH CARE EDUCATION/TRAINING PROGRAM

## 2024-04-23 RX ORDER — CALCIUM CARBONATE 500 MG/1
500 TABLET, CHEWABLE ORAL 3 TIMES DAILY PRN
Status: DISCONTINUED | OUTPATIENT
Start: 2024-04-23 | End: 2024-04-26 | Stop reason: HOSPADM

## 2024-04-23 RX ADMIN — PANTOPRAZOLE SODIUM 20 MG: 20 TABLET, DELAYED RELEASE ORAL at 06:04

## 2024-04-23 RX ADMIN — IBUPROFEN 800 MG: 400 TABLET ORAL at 08:29

## 2024-04-23 RX ADMIN — CALCIUM CARBONATE (ANTACID) CHEW TAB 500 MG 500 MG: 500 CHEW TAB at 14:24

## 2024-04-23 RX ADMIN — CYANOCOBALAMIN TAB 1000 MCG 1000 MCG: 1000 TAB at 08:24

## 2024-04-23 RX ADMIN — OMEGA-3 FATTY ACIDS CAP 1000 MG 1000 MG: 1000 CAP at 08:24

## 2024-04-23 RX ADMIN — GABAPENTIN 300 MG: 300 CAPSULE ORAL at 21:23

## 2024-04-23 RX ADMIN — FOLIC ACID 1 MG: 1 TABLET ORAL at 08:24

## 2024-04-23 RX ADMIN — METHADONE HYDROCHLORIDE 2.5 MG: 10 CONCENTRATE ORAL at 08:24

## 2024-04-23 RX ADMIN — GABAPENTIN 300 MG: 300 CAPSULE ORAL at 16:41

## 2024-04-23 RX ADMIN — NICOTINE 1 PATCH: 21 PATCH, EXTENDED RELEASE TRANSDERMAL at 08:23

## 2024-04-23 RX ADMIN — GABAPENTIN 300 MG: 300 CAPSULE ORAL at 08:24

## 2024-04-23 RX ADMIN — MELATONIN TAB 3 MG 3 MG: 3 TAB at 21:23

## 2024-04-23 RX ADMIN — ARIPIPRAZOLE 7.5 MG: 15 TABLET ORAL at 08:24

## 2024-04-23 RX ADMIN — FUROSEMIDE 20 MG: 20 TABLET ORAL at 08:24

## 2024-04-23 RX ADMIN — ALUMINUM HYDROXIDE, MAGNESIUM HYDROXIDE, AND DIMETHICONE 30 ML: 200; 20; 200 SUSPENSION ORAL at 11:33

## 2024-04-23 NOTE — PLAN OF CARE
Problem: Anxiety  Goal: Anxiety is at manageable level  Description: Interventions:  - Assess and monitor patient's anxiety level.   - Monitor for signs and symptoms (heart palpitations, chest pain, shortness of breath, headaches, nausea, feeling jumpy, restlessness, irritable, apprehensive).   - Collaborate with interdisciplinary team and initiate plan and interventions as ordered.  - [place] patient to unit/surroundings  - Explain treatment plan  - Encourage participation in care  - Encourage verbalization of concerns/fears  - Identify coping mechanisms  - Assist in developing anxiety-reducing skills  - Administer/offer alternative therapies  - Limit or eliminate stimulants  Outcome: Progressing     Problem: Risk for Violence/Aggression Toward Others  Goal: Treatment Goal: Refrain from acts of violence/aggression during length of stay, and demonstrate improved impulse control at the time of discharge  Outcome: Progressing  Goal: Verbalize thoughts and feelings  Description: Interventions:  - Assess and re-assess patient's level of risk, every waking shift  - Engage patient in 1:1 interactions, daily, for a minimum of 15 minutes   - Allow patient to express feelings and frustrations in a safe and non-threatening manner   - Establish rapport/trust with patient   Outcome: Progressing  Goal: Refrain from harming others  Outcome: Progressing  Goal: Refrain from destructive acts on the environment or property  Outcome: Progressing  Goal: Control angry outbursts  Description: Interventions:  - Monitor patient closely, per order  - Ensure early verbal de-escalation  - Monitor prn medication needs  - Set reasonable/therapeutic limits, outline behavioral expectations, and consequences   - Provide a non-threatening milieu, utilizing the least restrictive interventions   Outcome: Progressing  Goal: Attend and participate in unit activities, including therapeutic, recreational, and educational groups  Description:  Interventions:  - Provide therapeutic and educational activities daily, encourage attendance and participation, and document same in the medical record   Outcome: Progressing  Goal: Identify appropriate positive anger management techniques  Description: Interventions:  - Offer anger management and coping skills groups   - Staff will provide positive feedback for appropriate anger control  Outcome: Progressing     Problem: DISCHARGE PLANNING - CARE MANAGEMENT  Goal: Discharge to post-acute care or home with appropriate resources  Description: INTERVENTIONS:  - Conduct assessment to determine patient/family and health care team treatment goals, and need for post-acute services based on payer coverage, community resources, and patient preferences, and barriers to discharge  - Address psychosocial, clinical, and financial barriers to discharge as identified in assessment in conjunction with the patient/family and health care team  - Arrange appropriate level of post-acute services according to patient’s   needs and preference and payer coverage in collaboration with the physician and health care team  - Communicate with and update the patient/family, physician, and health care team regarding progress on the discharge plan  - Arrange appropriate transportation to post-acute venues  Outcome: Progressing

## 2024-04-23 NOTE — CASE MANAGEMENT
CM called [facility] to schedule the patient with a follow up appt for medication management/therapy, [phone]. No answer, CM left a VM requesting a call back.

## 2024-04-23 NOTE — NURSING NOTE
Pt is med/meal compliant. Irritable edge at times. Pt visible intermittently in the milieu but is mostly isolative to self. Pt denying any unmet needs.

## 2024-04-23 NOTE — TREATMENT TEAM
04/23/24 0736   Team Meeting   Meeting Type Daily Rounds   Initial Conference Date 04/23/24   Team Members Present   Team Members Present Physician;Nurse;;   Physician Team Member Dr. Jose, Loan Edmonds   Nursing Team Member Cehyenne   Care Management Team Member Ban   Social Work Team Member Aubree   Patient/Family Present   Patient Present No   Patient's Family Present No     Patient is calm, cooperative with care, medication compliant. Abilify increased to 7.5mg. Visible, social, and pleasant. Planned discharge to home on Friday 4/26/24.

## 2024-04-23 NOTE — PROGRESS NOTES
"Progress Note - Behavioral Health   Mac Wick 58 y.o. male MRN: [id number]  Unit/Bed#: [facility] 643-02 Encounter: [id number]    Assessment/Plan   Principal Problem:    Mood disorder (HCC)  Active Problems:    Medical clearance for psychiatric admission    Gastroesophageal reflux disease with esophagitis    History of drug abuse (HCC)    Opioid use disorder    Tobacco use    Bilateral lower extremity edema    Hypertriglyceridemia    Methadone dependence (HCC)      Recommended Treatment:   Continue Abilify to 7.5mg daily  Continue gabapentin 300mg 3 times daily for anxiety and chronic pain  Restarted methadone tablet 2.5mg daily after verifying with methadone clinic  (Taking 3mg daily back in clinic; will wean as tolerated)  Legal status: 201  Discharge planning continue; tentative Friday  Discussed with Kd Su 4/22 for collateral information; patient does not want discussion with mother or sister at this time.    Continue with group therapy, milieu therapy and occupational therapy.    Continue frequent safety checks and vitals per unit protocol.  Case discussed with treatment team.  Continue with [facility] medical management as indicated  Continue coordinating with case management regarding disposition  Risks, benefits and possible side effects of Medications: Risks, benefits, and possible side effects of medications have been explained to the patient, who verbalizes understanding    Legal Status: 201  ------------------------------------------------------------    Subjective: Per nursing report, Mac has been cooperative on the unit and compliant with medications.      Today, Mac is consenting for safety on the unit. He reports feeling \"great.\" Mac notes having good sleep  (7 hours of sleep but states previously only 2 hours). Mac states having a good appetite. Mac has been taking the medications as prescribed and reporting no side effects.    Mac reports no suicidal ideations. Mac " "reports no homicidal ideations. Regarding hallucinations, Mac denies any visual, auditory hallucinations.    Patient complained of soreness of left hip after too much activity, so he is resting a bit today. Patient feels being back on methadone and increase in abilify may be helping with his mood.    PRNs overnight: ibuprofen for hip pain  VS: Reviewed, within normal limits    Progress Toward Goals: slow improvement    Psychiatric Review of Systems:  Behavior over the last 24 hours: unchanged  Sleep: normal  Appetite: adequate  Medication side effects: none verbalized  ROS: Complete review of systems is negative except as noted above.    Vital signs in last 24 hours:  Temp:  [97.1 °F (36.2 °C)-98.4 °F (36.9 °C)] 98.4 °F (36.9 °C)  HR:  [63-89] 63  Resp:  [17-19] 19  BP: (129-168)/(59-90) 129/60    Mental Status Exam:  Appearance:  age appropriate, casually dressed, dressed appropriately, looks stated age, bearded   Behavior:  normal, pleasant, cooperative, calm   Speech:  normal rate and volume, coherent, pressured   Mood:  \"great\"   Affect:  overbright   Thought Process:  tangential   Associations: concrete associations   Thought Content:  no overt delusions   Perceptual Disturbances: Denies auditory or visual hallucinations and Does not appear to be responding to internal stimuli   Risk Potential: Suicidal ideation - None  Homicidal ideation - None at present  Potential for aggression - No longer agitated   Sensorium:  oriented to person, place, and time/date   Memory:  recent and remote memory grossly intact   Consciousness:  alert and awake   Attention/Concentration: attention span and concentration are age appropriate   Insight:  limited   Judgment: limited   Gait/Station: uses walker   Motor Activity: no abnormal movements     Current Medications:  Current Facility-Administered Medications   Medication Dose Route Frequency Provider Last Rate    aluminum-magnesium hydroxide-simethicone  30 mL Oral Q4H PRN " Liza Perry MD      ARIPiprazole  7.5 mg Oral Daily Karlie Jose MD      cloNIDine  0.1 mg Oral BID PRN Anton Carbajal MD      cyanocobalamin  1,000 mcg Oral Daily Citlali Worley-Morgan, CRNP      ergocalciferol  50,000 Units Oral Weekly Citlalicora Worley-Morgan, CRNP      fish oil  1,000 mg Oral Daily Citlali Meir-Morgan, CRNP      folic acid  1 mg Oral Daily Citlali Meir-Morgan, CRNP      furosemide  20 mg Oral Daily Citlalicora Worley-Morgan, CRNP      gabapentin  300 mg Oral TID Anton Carbajal MD      haloperidol lactate  5 mg Intramuscular Q4H PRN Max 4/day Liza Perry MD      hydrOXYzine HCL  25 mg Oral Q6H PRN Max 4/day Liza Perry MD      ibuprofen  400 mg Oral Q6H PRN Anton Carbajal MD      ibuprofen  600 mg Oral Q6H PRN Anton Carbajal MD      ibuprofen  800 mg Oral Q8H PRN Anton Carbajal MD      LORazepam  1 mg Intramuscular Q6H PRN Max 3/day Liza Perry MD      LORazepam  1 mg Oral BID PRN Anton Carbajal MD      melatonin  3 mg Oral HS Liza Perry MD      methadone  2.5 mg Oral Daily Karlie Jose MD      methocarbamol  500 mg Oral Q6H PRN Citlali MeirTrevaMorgan, CRNP      nicotine  1 patch Transdermal Daily Citlali WorleyMaximino, CRSEYMOUR      nicotine polacrilex  4 mg Oral Q2H PRN Liza Perry MD      pantoprazole  20 mg Oral Early Morning Citlali RolfSusan, CRNP      risperiDONE  0.25 mg Oral Q4H PRN Max 6/day Liza Perry MD      risperiDONE  0.5 mg Oral Q4H PRN Max 3/day Liza Perry MD      risperiDONE  1 mg Oral Q2H PRN Max 3/day Liza Perry MD         Behavioral Health Medications: all current active meds have been reviewed. Changes as in plan section above.    Laboratory results:  I have personally reviewed all pertinent laboratory/tests results.  Recent Results (from the past 48 hour(s))   Fingerstick Glucose (POCT)    Collection Time: 04/22/24  7:40 AM   Result Value Ref Range    POC Glucose 129 65 - 140 mg/dl         This note has been constructed using a voice recognition system. There may be translation, syntax, or grammatical errors. If you have any questions, please contact the dictating author.    Dominic Stahl MD

## 2024-04-23 NOTE — NURSING NOTE
Pt given PRN Maalox at 1133 for c/o severe indigestion. PRN minimally effective.     Pt asked for and was given Tums at 1424. Effective.

## 2024-04-23 NOTE — PROGRESS NOTES
04/23/24 1300   Activity/Group Checklist   Group Pet therapy   Attendance Attended   Attendance Duration (min) 16-30   Interactions Interacted appropriately  (Pt. spontaneously social with peers and the dog.)   Affect/Mood Bright   Goals Achieved Able to engage in interactions;Identified feelings

## 2024-04-24 PROCEDURE — 99232 SBSQ HOSP IP/OBS MODERATE 35: CPT | Performed by: STUDENT IN AN ORGANIZED HEALTH CARE EDUCATION/TRAINING PROGRAM

## 2024-04-24 RX ORDER — ARIPIPRAZOLE 10 MG/1
10 TABLET ORAL DAILY
Status: DISCONTINUED | OUTPATIENT
Start: 2024-04-25 | End: 2024-04-26 | Stop reason: HOSPADM

## 2024-04-24 RX ADMIN — NICOTINE POLACRILEX 4 MG: 4 GUM, CHEWING BUCCAL at 13:53

## 2024-04-24 RX ADMIN — GABAPENTIN 300 MG: 300 CAPSULE ORAL at 08:15

## 2024-04-24 RX ADMIN — NICOTINE 1 PATCH: 21 PATCH, EXTENDED RELEASE TRANSDERMAL at 08:14

## 2024-04-24 RX ADMIN — GABAPENTIN 300 MG: 300 CAPSULE ORAL at 16:18

## 2024-04-24 RX ADMIN — IBUPROFEN 800 MG: 400 TABLET ORAL at 19:47

## 2024-04-24 RX ADMIN — OMEGA-3 FATTY ACIDS CAP 1000 MG 1000 MG: 1000 CAP at 08:15

## 2024-04-24 RX ADMIN — HYDROXYZINE HYDROCHLORIDE 25 MG: 25 TABLET, FILM COATED ORAL at 21:44

## 2024-04-24 RX ADMIN — NICOTINE POLACRILEX 4 MG: 4 GUM, CHEWING BUCCAL at 17:02

## 2024-04-24 RX ADMIN — PANTOPRAZOLE SODIUM 20 MG: 20 TABLET, DELAYED RELEASE ORAL at 06:12

## 2024-04-24 RX ADMIN — GABAPENTIN 300 MG: 300 CAPSULE ORAL at 21:23

## 2024-04-24 RX ADMIN — METHADONE HYDROCHLORIDE 2.5 MG: 10 CONCENTRATE ORAL at 08:15

## 2024-04-24 RX ADMIN — FUROSEMIDE 20 MG: 20 TABLET ORAL at 08:15

## 2024-04-24 RX ADMIN — FOLIC ACID 1 MG: 1 TABLET ORAL at 08:15

## 2024-04-24 RX ADMIN — MELATONIN TAB 3 MG 3 MG: 3 TAB at 21:24

## 2024-04-24 RX ADMIN — CYANOCOBALAMIN TAB 1000 MCG 1000 MCG: 1000 TAB at 08:15

## 2024-04-24 RX ADMIN — ARIPIPRAZOLE 7.5 MG: 15 TABLET ORAL at 08:15

## 2024-04-24 NOTE — CASE MANAGEMENT
CM called [place] (3rd time) to schedule the patient with a follow up appt for medication management/therapy, [phone]. No answer, CM left a VM requesting a call back.

## 2024-04-24 NOTE — PLAN OF CARE
Problem: Anxiety  Goal: Anxiety is at manageable level  Description: Interventions:  - Assess and monitor patient's anxiety level.   - Monitor for signs and symptoms (heart palpitations, chest pain, shortness of breath, headaches, nausea, feeling jumpy, restlessness, irritable, apprehensive).   - Collaborate with interdisciplinary team and initiate plan and interventions as ordered.  - [place] patient to unit/surroundings  - Explain treatment plan  - Encourage participation in care  - Encourage verbalization of concerns/fears  - Identify coping mechanisms  - Assist in developing anxiety-reducing skills  - Administer/offer alternative therapies  - Limit or eliminate stimulants  Outcome: Progressing

## 2024-04-24 NOTE — DISCHARGE INSTR - APPOINTMENTS
Colette, or Xin, our Behavioral Health Nurse Navigators, will be calling you after your discharge, on the phone number that you provided.  They will be available as an additional support, if needed.   If you wish to speak with one of them, you may contact Colette at [phone] or Xin at [phone].

## 2024-04-24 NOTE — TREATMENT TEAM
04/24/24 1000   Activity/Group Checklist   Group Community meeting   Attendance Attended;Other (Comment)  (left early)   Attendance Duration (min) 31-45   Interactions Other (Comment)  (needed redirection for talking)   Affect/Mood Wide;Other (Comment)  (unable to focus, tangential)   Goals Achieved Identified feelings;Identified relapse prevention strategies;Discussed coping strategies;Able to listen to others;Able to engage in interactions;Able to self-disclose;Able to reflect/comment on own behavior

## 2024-04-24 NOTE — NURSING NOTE
Pt is pleasant on approach. Pt became slightly irritable when he began expressing his desire to be discharged before Friday, but was able to be redirected. He is med/meal compliant. Pt currently denies any unmet needs.

## 2024-04-24 NOTE — TREATMENT TEAM
04/24/24 1944   Pain Assessment   Pain Assessment Tool 0-10   Pain Score 8   Pain Location/Orientation Location: Generalized   Pain Onset/Description Onset: Ongoing   Effect of Pain on Daily Activities limiting   Patient's Stated Pain Goal No pain   Hospital Pain Intervention(s) Medication (See MAR)   Multiple Pain Sites No     PRN Motrin 800 given for ongoing generalized pain @ 1947

## 2024-04-24 NOTE — NURSING NOTE
"Patient visible. Patient denies SI/HI/AH/VH. Patient seen is small tv room listening to radio.  Patient describes mood \"I feel great. I'm going home tomorrow at 11am\". Patient med compliant. No agitation or irritability noted. VSS. Continual safety checks ongoing  "

## 2024-04-24 NOTE — TREATMENT TEAM
Pt completed  relapse prevention plan and signed. Copy in chart. Crisis, warmline and 988 numbers provided. [facility] and [facility].  Pt hopeful of d.c.     04/24/24 1500   Activity/Group Checklist   Group Admission/Discharge   Attendance Attended   Attendance Duration (min) 16-30   Interactions Interacted appropriately   Affect/Mood Appropriate   Goals Achieved Identified feelings;Discussed coping strategies;Identified relapse prevention strategies;Discussed discharge plans;Able to manage/cope with feelings;Able to engage in interactions;Able to listen to others;Able to reflect/comment on own behavior;Verbalized increased hopefulness;Able to self-disclose

## 2024-04-24 NOTE — PROGRESS NOTES
04/24/24 1100   Activity/Group Checklist   Group Exercise  (seated mindful movements program)   Attendance Attended   Attendance Duration (min) 31-45   Interactions Other (Comment)  (P.t joked about feeling like he  was doing too much yet was requested and observed staying within his comfort limits during the seated exercises.)   Affect/Mood Wide   Goals Achieved Able to listen to others;Able to engage in interactions

## 2024-04-24 NOTE — CASE MANAGEMENT
[place] [id number] [address] [phone]      Next Steps: Go to  Instructions: Appt Date: Wednesday 5/8/24 Appt Time: 11:15am

## 2024-04-24 NOTE — TREATMENT TEAM
04/24/24 0745   Team Meeting   Meeting Type Daily Rounds   Initial Conference Date 04/24/24   Team Members Present   Team Members Present Physician;Nurse;;   Physician Team Member Dr. Jose, Dr. Flowers, Maria Guadalupe ALVARES   Nursing Team Member Deborah   Care Management Team Member Ban   Social Work Team Member Aubree   Patient/Family Present   Patient Present No   Patient's Family Present No     Patient is denying all symptoms. The patient is social, visible, cooperative with care, medication compliant. Attends groups. Abiify to be increased to 10mg due to kavitha. Planned discharge to home on Friday 4/26/24 @ 11h.

## 2024-04-24 NOTE — PROGRESS NOTES
"Progress Note - Behavioral Health   Mac Wick 58 y.o. male MRN: [id number]  Unit/Bed#: [facility] 643-02 Encounter: [id number]    Assessment/Plan   Principal Problem:    Mood disorder (HCC)  Active Problems:    Medical clearance for psychiatric admission    Gastroesophageal reflux disease with esophagitis    History of drug abuse (HCC)    Opioid use disorder    Tobacco use    Bilateral lower extremity edema    Hypertriglyceridemia    Methadone dependence ([facility])      Recommended Treatment:   Increased Abilify to 10mg daily due to continued greater than expected euphoria  Continue gabapentin 300mg TID  Continue methadone 2.5mg oral daily taper  Legal status at 201  Discharge planned for Friday at 11AM- Patient agreeable to discharge on Friday after discussion  Patient does not want discussion with mother or sister; ok for discussion with wife.    Continue with group therapy, milieu therapy and occupational therapy.    Continue frequent safety checks and vitals per unit protocol.  Case discussed with treatment team.  Continue with SLIM medical management as indicated  Continue coordinating with case management regarding disposition  Risks, benefits and possible side effects of Medications: Risks, benefits, and possible side effects of medications have been explained to the patient, who verbalizes understanding    Legal Status: 201  ------------------------------------------------------------    Subjective: Per nursing report, Mac has been cooperative on the unit and compliant with medications.      Today, Mac is consenting for safety on the unit. He reports feeling \"very  good.\" Mac notes having good sleep although  stating chronic nightmares. Mac states having a good appetite. Mac has been taking the medications as prescribed and reporting no side effects.    Patient stated having a confrontation due to requesting wanting to leave earlier. He states wife wanted to have him leave earlier. He states " "staff were \"aggressive\" and \"jumped him.\" He states he was appropriate and states that he was ok with leaving on Friday.    However, from discussion with staff and attending, patient was more aggressive and agitated and tried to verbally attempt earlier discharge despite the 72 hour discharge rule.    Mac denies suicidal ideations. Mac denies homicidal ideations. Regarding hallucinations, Mac denies hallucinations    PRNs overnight: None   VS: Reviewed, within normal limits    Progress Toward Goals: stable    Psychiatric Review of Systems:  Behavior over the last 24 hours: unchanged  Sleep: normal  Appetite: adequate  Medication side effects: none verbalized  ROS: Complete review of systems is negative except as noted above.    Vital signs in last 24 hours:  Temp:  [98 °F (36.7 °C)-98.2 °F (36.8 °C)] 98.2 °F (36.8 °C)  HR:  [76-83] 83  Resp:  [18-19] 19  BP: (133-140)/(62-81) 140/81    Mental Status Exam:  Appearance:  age appropriate, casually dressed, looks stated age, bearded   Behavior:  cooperative   Speech:  normal rate, normal volume, coherent, pressured   Mood:  \"Very good\"   Affect:  overbright   Thought Process:  tangential   Associations: concrete associations   Thought Content:  no overt delusions   Perceptual Disturbances: Denies auditory or visual hallucinations and Does not appear to be responding to internal stimuli   Risk Potential: Suicidal ideation - None  Homicidal ideation - None at present  Potential for aggression - Not at present   Sensorium:  oriented to person, place, and time/date   Memory:  recent and remote memory grossly intact   Consciousness:  alert and awake   Attention/Concentration: attention span and concentration are age appropriate   Insight:  limited   Judgment: limited   Gait/Station: uses walker   Motor Activity: no abnormal movements     Current Medications:  Current Facility-Administered Medications   Medication Dose Route Frequency Provider Last Rate    " aluminum-magnesium hydroxide-simethicone  30 mL Oral Q4H PRN Liza Perry MD      [START ON 4/25/2024] ARIPiprazole  10 mg Oral Daily Dominic Stahl MD      calcium carbonate  500 mg Oral TID PRN Citlali House, KIMBERLYNP      cloNIDine  0.1 mg Oral BID PRN Anton Carbajal MD      cyanocobalamin  1,000 mcg Oral Daily Citlali House, DIA      ergocalciferol  50,000 Units Oral Weekly Citlali House, CRNP      fish oil  1,000 mg Oral Daily Citlalicora House, CRSEYMOUR      folic acid  1 mg Oral Daily Citlali House, DIA      furosemide  20 mg Oral Daily Citlali House, DIA      gabapentin  300 mg Oral TID Anton Carbajal MD      haloperidol lactate  5 mg Intramuscular Q4H PRN Max 4/day Liza Perry MD      hydrOXYzine HCL  25 mg Oral Q6H PRN Max 4/day Liza Perry MD      ibuprofen  400 mg Oral Q6H PRN Anton Carbajal MD      ibuprofen  600 mg Oral Q6H PRN Anton Carbajal MD      ibuprofen  800 mg Oral Q8H PRN Anton Carbajal MD      LORazepam  1 mg Intramuscular Q6H PRN Max 3/day Liza ePrry MD      LORazepam  1 mg Oral BID PRN Anton Carbajal MD      melatonin  3 mg Oral HS Liza Perry MD      methadone  2.5 mg Oral Daily Karlie Jose MD      methocarbamol  500 mg Oral Q6H PRN DIA Amaya      nicotine  1 patch Transdermal Daily DIA Amaya      nicotine polacrilex  4 mg Oral Q2H PRN Liza Perry MD      pantoprazole  20 mg Oral Early Morning DIA Amaya      risperiDONE  0.25 mg Oral Q4H PRN Max 6/day Liza Perry MD      risperiDONE  0.5 mg Oral Q4H PRN Max 3/day Liza Perry MD      risperiDONE  1 mg Oral Q2H PRN Max 3/day Liza Perry MD         Behavioral Health Medications: all current active meds have been reviewed. Changes as in plan section above.    Laboratory results:  I have personally reviewed all pertinent laboratory/tests results.  No results found for this or  any previous visit (from the past 48 hour(s)).     This note has been constructed using a voice recognition system. There may be translation, syntax, or grammatical errors. If you have any questions, please contact the dictating author.    Dominic Stahl MD

## 2024-04-24 NOTE — NURSING NOTE
Pt is visible on unit, social with peers. Pt denies all psych s/s, medication complaint, safety checks ongoing.

## 2024-04-25 LAB — GLUCOSE SERPL-MCNC: 113 MG/DL (ref 65–140)

## 2024-04-25 PROCEDURE — 82948 REAGENT STRIP/BLOOD GLUCOSE: CPT

## 2024-04-25 PROCEDURE — 99232 SBSQ HOSP IP/OBS MODERATE 35: CPT | Performed by: STUDENT IN AN ORGANIZED HEALTH CARE EDUCATION/TRAINING PROGRAM

## 2024-04-25 RX ORDER — FOLIC ACID 1 MG/1
1 TABLET ORAL DAILY
Qty: 30 TABLET | Refills: 0 | Status: SHIPPED | OUTPATIENT
Start: 2024-04-26 | End: 2024-05-26

## 2024-04-25 RX ORDER — ARIPIPRAZOLE 10 MG/1
10 TABLET ORAL DAILY
Qty: 30 TABLET | Refills: 0 | Status: SHIPPED | OUTPATIENT
Start: 2024-04-25 | End: 2024-05-25

## 2024-04-25 RX ORDER — NICOTINE 21 MG/24HR
1 PATCH, TRANSDERMAL 24 HOURS TRANSDERMAL DAILY
Qty: 28 PATCH | Refills: 0 | Status: SHIPPED | OUTPATIENT
Start: 2024-04-26

## 2024-04-25 RX ORDER — GABAPENTIN 300 MG/1
300 CAPSULE ORAL 3 TIMES DAILY
Qty: 90 CAPSULE | Refills: 0 | Status: SHIPPED | OUTPATIENT
Start: 2024-04-25 | End: 2024-05-25

## 2024-04-25 RX ORDER — LANOLIN ALCOHOL/MO/W.PET/CERES
3 CREAM (GRAM) TOPICAL
Qty: 30 TABLET | Refills: 0 | Status: SHIPPED | OUTPATIENT
Start: 2024-04-25 | End: 2024-05-25

## 2024-04-25 RX ADMIN — HYDROXYZINE HYDROCHLORIDE 25 MG: 25 TABLET, FILM COATED ORAL at 21:08

## 2024-04-25 RX ADMIN — OMEGA-3 FATTY ACIDS CAP 1000 MG 1000 MG: 1000 CAP at 08:08

## 2024-04-25 RX ADMIN — NICOTINE POLACRILEX 4 MG: 4 GUM, CHEWING BUCCAL at 07:51

## 2024-04-25 RX ADMIN — GABAPENTIN 300 MG: 300 CAPSULE ORAL at 16:18

## 2024-04-25 RX ADMIN — GABAPENTIN 300 MG: 300 CAPSULE ORAL at 21:08

## 2024-04-25 RX ADMIN — PANTOPRAZOLE SODIUM 20 MG: 20 TABLET, DELAYED RELEASE ORAL at 06:00

## 2024-04-25 RX ADMIN — NICOTINE POLACRILEX 4 MG: 4 GUM, CHEWING BUCCAL at 19:02

## 2024-04-25 RX ADMIN — IBUPROFEN 800 MG: 400 TABLET ORAL at 08:38

## 2024-04-25 RX ADMIN — GABAPENTIN 300 MG: 300 CAPSULE ORAL at 08:08

## 2024-04-25 RX ADMIN — METHADONE HYDROCHLORIDE 2.5 MG: 10 CONCENTRATE ORAL at 08:08

## 2024-04-25 RX ADMIN — MELATONIN TAB 3 MG 3 MG: 3 TAB at 21:08

## 2024-04-25 RX ADMIN — FUROSEMIDE 20 MG: 20 TABLET ORAL at 08:08

## 2024-04-25 RX ADMIN — NICOTINE POLACRILEX 4 MG: 4 GUM, CHEWING BUCCAL at 10:29

## 2024-04-25 RX ADMIN — HYDROXYZINE HYDROCHLORIDE 25 MG: 25 TABLET, FILM COATED ORAL at 14:11

## 2024-04-25 RX ADMIN — NICOTINE POLACRILEX 4 MG: 4 GUM, CHEWING BUCCAL at 13:41

## 2024-04-25 RX ADMIN — CYANOCOBALAMIN TAB 1000 MCG 1000 MCG: 1000 TAB at 08:08

## 2024-04-25 RX ADMIN — FOLIC ACID 1 MG: 1 TABLET ORAL at 08:08

## 2024-04-25 RX ADMIN — ARIPIPRAZOLE 10 MG: 10 TABLET ORAL at 08:08

## 2024-04-25 NOTE — BH TRANSITION RECORD
Contact Information: If you have any questions, concerns, pended studies, tests and/or procedures, or emergencies regarding your inpatient behavioral health visit. Please contact [facility] [phone] and ask to speak to a , nurse or physician. A contact is available 24 hours/ 7 days a week at this number.     Summary of Procedures Performed During your Stay:  Below is a list of major procedures performed during your hospital stay and a summary of results:  - Cardiac Procedures/Studies: EKG on 4/19/24: NSR; L Ant Fascicular Block.    Pending Studies (From admission, onward)      None          Please follow up on the above pending studies with your PCP and/or referring provider.

## 2024-04-25 NOTE — TREATMENT TEAM
04/25/24 0754   Team Meeting   Meeting Type Daily Rounds   Initial Conference Date 04/25/24   Team Members Present   Team Members Present Physician;Nurse;;   Physician Team Member Dr. Jose, Dr. Flowers, Maria Guadalupe ALVARES   Nursing Team Member Cheyenne Cabrera   Care Management Team Member Ban   Social Work Team Member Aubree   Patient/Family Present   Patient Present No   Patient's Family Present No     Abilify increased to 10mg yesterday. Completed relapse prevention plan. The patient is cooperative with care and medication compliant. Planned discharge to home tomorrow 4/26/24 @ 11h.

## 2024-04-25 NOTE — PLAN OF CARE
Problem: Anxiety  Goal: Anxiety is at manageable level  Description: Interventions:  - Assess and monitor patient's anxiety level.   - Monitor for signs and symptoms (heart palpitations, chest pain, shortness of breath, headaches, nausea, feeling jumpy, restlessness, irritable, apprehensive).   - Collaborate with interdisciplinary team and initiate plan and interventions as ordered.  - [place] patient to unit/surroundings  - Explain treatment plan  - Encourage participation in care  - Encourage verbalization of concerns/fears  - Identify coping mechanisms  - Assist in developing anxiety-reducing skills  - Administer/offer alternative therapies  - Limit or eliminate stimulants  Outcome: Progressing     Problem: Risk for Violence/Aggression Toward Others  Goal: Verbalize thoughts and feelings  Description: Interventions:  - Assess and re-assess patient's level of risk, every waking shift  - Engage patient in 1:1 interactions, daily, for a minimum of 15 minutes   - Allow patient to express feelings and frustrations in a safe and non-threatening manner   - Establish rapport/trust with patient   Outcome: Progressing  Goal: Refrain from destructive acts on the environment or property  Outcome: Progressing  Goal: Control angry outbursts  Description: Interventions:  - Monitor patient closely, per order  - Ensure early verbal de-escalation  - Monitor prn medication needs  - Set reasonable/therapeutic limits, outline behavioral expectations, and consequences   - Provide a non-threatening milieu, utilizing the least restrictive interventions   Outcome: Progressing  Goal: Attend and participate in unit activities, including therapeutic, recreational, and educational groups  Description: Interventions:  - Provide therapeutic and educational activities daily, encourage attendance and participation, and document same in the medical record   Outcome: Progressing

## 2024-04-25 NOTE — DISCHARGE SUMMARY
Discharge Summary - Behavioral Health   Mac Wick 58 y.o. male MRN: [id number]  Unit/Bed#: [facility] 643-02 Encounter: [id number]     Admission Date:   Admission Orders (From admission, onward)       Ordered        04/20/24 1411  ED TO DIFFERENT CAMPUS [facility] UNIT or INPATIENT MEDICAL UNIT to [facility] UNIT (using Discharge Readmit Navigator) - Admit Patient to IP Behavioral Health Unit  Once                              Discharge Date: 04/26/24     Attending Psychiatrist: Karlie Jose MD     Discharge Diagnosis:   Principal Problem:    Mood disorder (HCC)  Active Problems:    Medical clearance for psychiatric admission    Gastroesophageal reflux disease with esophagitis    History of drug abuse (HCC)    Opioid use disorder    Tobacco use    Bilateral lower extremity edema    Hypertriglyceridemia    Methadone dependence ([facility])      Reason for Admission:   Mac is a 58 y.o. male who initially presented with Severe agitation.. He was admitted to the psychiatric unit on a involuntary 302 commitment which was switched to a 201 commitment after discussion with patient. Please see initial H&P for full details.    Hospital Course:   Prior to admission pt was on Abilify 10mg daily. Methadone for substance use, Omeprazole, robaxin, lasix, venlafaxine, adalimumab.    The patient was admitted to the inpatient psychiatric unit and started on behavioral health checks every 7 minutes per unit protocol. Upon admission, the patient was evaluated by the medical service for medical clearance and further management of medical issues as needed. At the start of hospitalization, he was cooperative, overbright and tangential. During hospitalization, Mac Wick was encourage to participate in group therapy, milieu therapy, and occupational therapy. Patient was initially admitted on 302 involuntary commitment but patient was agreeable to a 201. Upon evaluation, he was started on small dose abilify 5mg daily which was increased  "to 10mg daily to address symptoms of agitation. Possible side effects were discussed with the patient prior to initiation, and he verbalized understanding. Medications were appropriately titrated to 10mg daily as his previous dose.     The patient adhered to his medication regimen and denied any acute adverse effects not otherwise mentioned. His symptoms began to improve, and his affect became calmer throughout the course of psychiatric management. He reported igood sleep, appetite. He was seen in [facility] interacting appropriately with peers and participating in group therapy. Mac did not demonstrate dangerous behavior or thoughts to self or peers leading up to day of discharge- patient did become agitated once but was able to be reasoned and notably calmed afterward. As he began to improve, the treatment team agreed he was safe for discharge with plan to continue outpatient treatment.    On the day of discharge, Mac denied suicidal or homicidal ideations, as well as auditory and visual hallucinations. He reported feeling \"very  good.\" His goals are to Followup care with PCP and methadone clinic and podiatry clinic. Applicable follow up and safety plan was reviewed with the patient prior to discharge.      I reviewed with Mac the importance of compliance with medications and outpatient treatment after discharge. and I discussed with Mac recommendation to follow up with outpatient drug and alcohol counseling and AA meetings.    Discharge Medications  Psychiatric medications include:  Abilify 10mg daily    Other home medications for medical conditions were continued throughout hospitalization, if applicable. See AVS for more details.     Follow ups:  Patient to followup with primary care doctor in 7-10 days, [facility] 5/8/2024 and podiatry in 1 month.    Risk of Harm to Self:   The following ratings are based on assessment at the time of discharge    Risk of Harm to Others:  The " following ratings are based on assessment at the time of discharge    Labs/Imaging:   I have personally reviewed all pertinent laboratory/tests results.    Mental Status Exam:  Appearance:  age appropriate, dressed appropriately, looks stated age  sitting comfortably in chair, adequate hygiene and grooming, cooperative with interview, good eye contact    Behavior:  calm and cooperative   Speech:  normal rate, normal volume, normal pitch, fluent, clear, coherent, and pressured   Mood:  euphoric   Affect:  Bright   Language: Within normal limits   Thought Process:  organized, logical, goal directed, normal rate of thoughts   Thought Content:  no verbalized delusions or overt paranoia   Perceptual Disturbances: no reported hallucinations and does not appear to be responding to internal stimuli at this time   Risk Potential: No active or passive suicidal or homicidal ideation was verbalized during interview   Sensorium:  person, place, time, and current situation   Cognition:  Grossly intact   Consciousness:  alert and awake   Attention: attention span and concentration were age appropriate   Intellect:   appears to be of average intelligence     Motor: no abnormal movements  normal gait/station (uses walker)   Insight:  limited   Judgement: limited     Discharge Medications:  See list below, as well as the after visit summary containing reconciled discharge medications provided to patient and family.      Current Facility-Administered Medications   Medication Dose Route Frequency Provider Last Rate    aluminum-magnesium hydroxide-simethicone  30 mL Oral Q4H PRN Liza Perry MD      ARIPiprazole  10 mg Oral Daily Dominic Stahl MD      calcium carbonate  500 mg Oral TID PRN DIA Amaya      cloNIDine  0.1 mg Oral BID PRN Anton Carbajal MD      cyanocobalamin  1,000 mcg Oral Daily DIA Amaya      ergocalciferol  50,000 Units Oral Weekly DIA Amaya      fish oil  1,000  mg Oral Daily DIA Amaya      folic acid  1 mg Oral Daily DIA Amaya      furosemide  20 mg Oral Daily DIA Amaya      gabapentin  300 mg Oral TID Anton Carbajal MD      haloperidol lactate  5 mg Intramuscular Q4H PRN Max 4/day Liza Perry MD      hydrOXYzine HCL  25 mg Oral Q6H PRN Max 4/day Liza Perry MD      ibuprofen  400 mg Oral Q6H PRN Anton Carbajal MD      ibuprofen  600 mg Oral Q6H PRN Anton Carbajal MD      ibuprofen  800 mg Oral Q8H PRN Anton Carbajal MD      LORazepam  1 mg Intramuscular Q6H PRN Max 3/day Liza Perry MD      LORazepam  1 mg Oral BID PRN Anton Carbajal MD      melatonin  3 mg Oral HS Liza Perry MD      methadone  2.5 mg Oral Daily Karlie Jose MD      methocarbamol  500 mg Oral Q6H PRN DIA Amaya      nicotine  1 patch Transdermal Daily DIA Amaya      nicotine polacrilex  4 mg Oral Q2H PRN Liza Perry MD      pantoprazole  20 mg Oral Early Morning DIA Amaya      risperiDONE  0.25 mg Oral Q4H PRN Max 6/day Liza Perry MD      risperiDONE  0.5 mg Oral Q4H PRN Max 3/day Liza Perry MD      risperiDONE  1 mg Oral Q2H PRN Max 3/day Liza Perry MD          Discharge instructions/Information to patient and family:   See after visit summary for information provided to patient and family.      Provisions for Follow-Up Care:  See after visit summary for information related to follow-up care and any pertinent home health orders.      This note has been constructed using a voice recognition system. There may be translation, syntax,  or grammatical errors. If you have any questions, please contact the dictating provider.    Dominic Stahl MD

## 2024-04-25 NOTE — TREATMENT TEAM
04/24/24 2143   Bella Anxiety Scale   Anxious Mood 2   Tension 2   Fears 2   Insomnia 1   Intellectual 1   Depressed Mood 0   Somatic Complaints: Muscular 0   Somatic Complaints: Sensory 0   Cardiovascular Symptoms 0   Respiratory Symptoms 0   Gastrointestinal Symptoms 0   Genitourinary Symptoms 0   Autonomic Symptoms 1   Behavior at Interview 2   Bella Anxiety Score 11     PRN Atarax 25 given for ham score 11

## 2024-04-25 NOTE — PROGRESS NOTES
04/25/24 1330   Activity/Group Checklist   Group Life Skills   Attendance Attended   Attendance Duration (min) 0-15   Interactions Other (Comment)  (Pt. abruptly left session after being triggered by another female who labled him as scarey)   Affect/Mood Angry

## 2024-04-25 NOTE — PROGRESS NOTES
"Progress Note - Behavioral Health   Mac Wick 58 y.o. male MRN: [id number]  Unit/Bed#: [facility] 643-02 Encounter: [id number]    Assessment/Plan   Principal Problem:    Mood disorder (HCC)  Active Problems:    Medical clearance for psychiatric admission    Gastroesophageal reflux disease with esophagitis    History of drug abuse (HCC)    Opioid use disorder    Tobacco use    Bilateral lower extremity edema    Hypertriglyceridemia    Methadone dependence (HCC)      Recommended Treatment:   Continue Abilify 10mg daily  Continue  gabapentin 300mg TID  Continue methadone 2.5mg oral daily  On 201  Discharge plan for Friday 11AM- Patient continues to be agreeable    Continue with group therapy, milieu therapy and occupational therapy.    Continue frequent safety checks and vitals per unit protocol.  Case discussed with treatment team.  Continue with SLIM medical management as indicated  Continue coordinating with case management regarding disposition  Risks, benefits and possible side effects of Medications: Risks, benefits, and possible side effects of medications have been explained to the patient, who verbalizes understanding    Legal Status: 201  ------------------------------------------------------------    Subjective: Per nursing report, Mac has been cooperative on the unit and compliant with medications.      Today, Mac is consenting for safety on the unit. He reports feeling \"very good.\" Mac notes having good sleep (denies nightmares while off nicotine patch). Mac states having a good appetite. Mac has been taking the medications as prescribed and reporting no side effects.    Mac denies suicidal ideations. Mac denies homicidal ideations. Regarding hallucinations, Mac denies hallucinations.    Patient agreeable to discharge tomorrow to home. He states that his wife's concern about discharge seems resolved. He reports being more cooperative with the staff. Nursing reports patient " "being more cooperative.    PRNs overnight: nicotine gum   VS: Reviewed, within normal limits    Progress Toward Goals: slow improvement    Psychiatric Review of Systems:  Behavior over the last 24 hours: improved  Sleep: normal  Appetite: adequate  Medication side effects: none verbalized  ROS: Complete review of systems is negative except as noted above.    Vital signs in last 24 hours:  Temp:  [97.5 °F (36.4 °C)-98.7 °F (37.1 °C)] 97.5 °F (36.4 °C)  HR:  [60-95] 60  Resp:  [20-21] 20  BP: (136-169)/(64-76) 136/64    Mental Status Exam:  Appearance:  age appropriate, casually dressed, bearded   Behavior:  cooperative   Speech:  normal rate, normal volume, coherent, pressured   Mood:  \"Very  good\"   Affect:  brighter   Thought Process:  organized, coherent, normal rate of thoughts   Associations: concrete associations   Thought Content:  no overt delusions   Perceptual Disturbances: Denies auditory or visual hallucinations and Does not appear to be responding to internal stimuli   Risk Potential: Suicidal ideation - None  Homicidal ideation - None at present  Potential for aggression - No   Sensorium:  oriented to person, place, and time/date   Memory:  recent and remote memory grossly intact   Consciousness:  alert and awake   Attention/Concentration: attention span and concentration are age appropriate   Insight:  limited   Judgment: limited   Gait/Station: uses walker   Motor Activity: no abnormal movements     Current Medications:  Current Facility-Administered Medications   Medication Dose Route Frequency Provider Last Rate    aluminum-magnesium hydroxide-simethicone  30 mL Oral Q4H PRN Liza Perry MD      ARIPiprazole  10 mg Oral Daily Dominic Stahl MD      calcium carbonate  500 mg Oral TID PRN DIA Amaya      cloNIDine  0.1 mg Oral BID PRN Anton Carbajal MD      cyanocobalamin  1,000 mcg Oral Daily DIA Amaya      ergocalciferol  50,000 Units Oral Weekly Citlali " Harsh, CRNP      fish oil  1,000 mg Oral Daily Citlali RolfSusan, CRNP      folic acid  1 mg Oral Daily Citlali House, CRSEYMOUR      furosemide  20 mg Oral Daily Citlali House, CRSEYMOUR      gabapentin  300 mg Oral TID Anton Carbajal MD      haloperidol lactate  5 mg Intramuscular Q4H PRN Max 4/day Liza Perry MD      hydrOXYzine HCL  25 mg Oral Q6H PRN Max 4/day Liza Perry MD      ibuprofen  400 mg Oral Q6H PRN Anton Carbajal MD      ibuprofen  600 mg Oral Q6H PRN Anton Carbajal MD      ibuprofen  800 mg Oral Q8H PRN Anton Carbajal MD      LORazepam  1 mg Intramuscular Q6H PRN Max 3/day Liza Perry MD      LORazepam  1 mg Oral BID PRN Anton Carbajal MD      melatonin  3 mg Oral HS Liza Perry MD      methadone  2.5 mg Oral Daily Karlie Jose MD      methocarbamol  500 mg Oral Q6H PRN Citlali House, DIA      nicotine  1 patch Transdermal Daily Citlali House, DIA      nicotine polacrilex  4 mg Oral Q2H PRN Liza Perry MD      pantoprazole  20 mg Oral Early Morning Citlali House, DIA      risperiDONE  0.25 mg Oral Q4H PRN Max 6/day Liza Perry MD      risperiDONE  0.5 mg Oral Q4H PRN Max 3/day Liza Perry MD      risperiDONE  1 mg Oral Q2H PRN Max 3/day Liza Perry MD         Behavioral Health Medications: all current active meds have been reviewed. Changes as in plan section above.    Laboratory results:  I have personally reviewed all pertinent laboratory/tests results.  Recent Results (from the past 48 hour(s))   Fingerstick Glucose (POCT)    Collection Time: 04/25/24  9:48 AM   Result Value Ref Range    POC Glucose 113 65 - 140 mg/dl        This note has been constructed using a voice recognition system. There may be translation, syntax, or grammatical errors. If you have any questions, please contact the dictating author.    Dominic Stahl MD

## 2024-04-25 NOTE — PROGRESS NOTES
04/25/24 1000   Activity/Group Checklist   Group Community meeting   Attendance Attended;Other (Comment)  (left and returned)   Attendance Duration (min) 31-45   Interactions Other (Comment)  (needed redirection for comments to peer)   Affect/Mood Wide;Other (Comment)  (focus)   Goals Achieved Identified feelings;Discussed coping strategies;Able to engage in interactions;Able to listen to others;Able to manage/cope with feelings;Able to reflect/comment on own behavior;Verbalized increased hopefulness;Able to self-disclose;Able to recieve feedback

## 2024-04-25 NOTE — NURSING NOTE
Pt is pleasant on approach. He is med/meal compliant. Pt refused his nicotine patch due to nightmares. He is visible and social in the milieu. Pt currently denies any unmet needs and is looking forward to being discharged tomorrow.

## 2024-04-26 VITALS
SYSTOLIC BLOOD PRESSURE: 168 MMHG | DIASTOLIC BLOOD PRESSURE: 81 MMHG | TEMPERATURE: 97.5 F | HEIGHT: 68 IN | WEIGHT: 273.7 LBS | RESPIRATION RATE: 19 BRPM | BODY MASS INDEX: 41.48 KG/M2 | OXYGEN SATURATION: 99 % | HEART RATE: 82 BPM

## 2024-04-26 PROCEDURE — 99238 HOSP IP/OBS DSCHRG MGMT 30/<: CPT | Performed by: STUDENT IN AN ORGANIZED HEALTH CARE EDUCATION/TRAINING PROGRAM

## 2024-04-26 RX ADMIN — HYDROXYZINE HYDROCHLORIDE 25 MG: 25 TABLET, FILM COATED ORAL at 08:28

## 2024-04-26 RX ADMIN — GABAPENTIN 300 MG: 300 CAPSULE ORAL at 08:59

## 2024-04-26 RX ADMIN — IBUPROFEN 800 MG: 400 TABLET ORAL at 08:28

## 2024-04-26 RX ADMIN — FOLIC ACID 1 MG: 1 TABLET ORAL at 08:27

## 2024-04-26 RX ADMIN — CYANOCOBALAMIN TAB 1000 MCG 1000 MCG: 1000 TAB at 08:30

## 2024-04-26 RX ADMIN — FUROSEMIDE 20 MG: 20 TABLET ORAL at 08:27

## 2024-04-26 RX ADMIN — ARIPIPRAZOLE 10 MG: 10 TABLET ORAL at 08:27

## 2024-04-26 RX ADMIN — PANTOPRAZOLE SODIUM 20 MG: 20 TABLET, DELAYED RELEASE ORAL at 05:50

## 2024-04-26 RX ADMIN — NICOTINE POLACRILEX 4 MG: 4 GUM, CHEWING BUCCAL at 09:59

## 2024-04-26 RX ADMIN — METHADONE HYDROCHLORIDE 2.5 MG: 10 CONCENTRATE ORAL at 08:29

## 2024-04-26 RX ADMIN — OMEGA-3 FATTY ACIDS CAP 1000 MG 1000 MG: 1000 CAP at 08:27

## 2024-04-26 NOTE — PLAN OF CARE
Problem: Anxiety  Goal: Anxiety is at manageable level  Description: Interventions:  - Assess and monitor patient's anxiety level.   - Monitor for signs and symptoms (heart palpitations, chest pain, shortness of breath, headaches, nausea, feeling jumpy, restlessness, irritable, apprehensive).   - Collaborate with interdisciplinary team and initiate plan and interventions as ordered.  - [place] patient to unit/surroundings  - Explain treatment plan  - Encourage participation in care  - Encourage verbalization of concerns/fears  - Identify coping mechanisms  - Assist in developing anxiety-reducing skills  - Administer/offer alternative therapies  - Limit or eliminate stimulants  Outcome: Progressing     Problem: Risk for Violence/Aggression Toward Others  Goal: Verbalize thoughts and feelings  Description: Interventions:  - Assess and re-assess patient's level of risk, every waking shift  - Engage patient in 1:1 interactions, daily, for a minimum of 15 minutes   - Allow patient to express feelings and frustrations in a safe and non-threatening manner   - Establish rapport/trust with patient   Outcome: Progressing  Goal: Refrain from harming others  Outcome: Progressing     Problem: Potential for Falls  Goal: Patient will remain free of falls  Description: INTERVENTIONS:  - Educate patient/family on patient safety including physical limitations  - Instruct patient to call for assistance with activity   - Consult OT/PT to assist with strengthening/mobility   - Keep Call bell within reach  - Keep bed low and locked with side rails adjusted as appropriate  - Keep care items and personal belongings within reach  - Initiate and maintain comfort rounds  - Make Fall Risk Sign visible to staff  - Obtain necessary fall risk management equipment: walker, non-skid socks, side rails, call bell  - Apply yellow socks and bracelet for high fall risk patients  - Consider moving patient to room near nurses station  Outcome:  Progressing

## 2024-04-26 NOTE — NURSING NOTE
Patient is visible in the milieu, calm, pleasant and social. Patient is D/C home today, VSS. Patient denies pain when discharged.Denies SI/HI, AVH. Patient reports no anxiety and depression. Medication and F/U appointment reviewed with patient, patient verbalized understanding. Patient left unit with all belongings accompanied by staff.

## 2024-04-26 NOTE — PROGRESS NOTES
04/26/24 0734   Discharge Planning   Living Arrangements Lives w/ Spouse/significant other   Support Systems Self;Children;Family members;Spouse/significant other;Psychiatrist;Therapist   Assistance Needed None   Type of Current Residence Private residence   Current Home Care Services No   DME Referral Provided   DME Needed: None   Other Referral/Resources/Interventions Provided:   Financial Resources Provided Indigent Transportation   Referrals Provided: Crisis Hotline   Discharge Communications   Discharge planning discussed with: Patient, Patient's Fiance   Freedom of Choice Yes   Contacts   Patient Contacts jimmy Lind   Contact Method Phone   Phone Number    Reason/Outcome Emergency Contact;Discharge Planning   [facility]   Would you like to participate in our [facility] service program?   No - Declined
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
## 2024-04-26 NOTE — TREATMENT TEAM
04/26/24 0828   Bella Anxiety Scale   Anxious Mood 2   Tension 2   Fears 2   Insomnia 2   Intellectual 2   Depressed Mood 0   Somatic Complaints: Muscular 0   Somatic Complaints: Sensory 0   Cardiovascular Symptoms 0   Respiratory Symptoms 0   Gastrointestinal Symptoms 0   Genitourinary Symptoms 0   Autonomic Symptoms 0   Behavior at Interview 2   Bella Anxiety Score 12     Patient requested and was given PRN Atarax at 0828.

## 2024-04-26 NOTE — PLAN OF CARE
Problem: DISCHARGE PLANNING - CARE MANAGEMENT  Goal: Discharge to post-acute care or home with appropriate resources  Description: INTERVENTIONS:  - Conduct assessment to determine patient/family and health care team treatment goals, and need for post-acute services based on payer coverage, community resources, and patient preferences, and barriers to discharge  - Address psychosocial, clinical, and financial barriers to discharge as identified in assessment in conjunction with the patient/family and health care team  - Arrange appropriate level of post-acute services according to patient’s   needs and preference and payer coverage in collaboration with the physician and health care team  - Communicate with and update the patient/family, physician, and health care team regarding progress on the discharge plan  - Arrange appropriate transportation to post-acute venues  Outcome: Adequate for Discharge    The patient is cleared to discharge home today. The patient will leave at 11h via Lyft. The patient is in agreement with his discharge plan. The patient's medications were sent to his preferred pharmacy. The patient will continue following up with [facility] for OP MH services, and [facility] for OP D&A treatment. Additional resources were put onto the patient's AVS.

## 2024-04-26 NOTE — TREATMENT TEAM
04/25/24 2108   Bella Anxiety Scale   Anxious Mood 2   Tension 2   Fears 2   Insomnia 2   Intellectual 2   Depressed Mood 0   Somatic Complaints: Muscular 0   Somatic Complaints: Sensory 0   Cardiovascular Symptoms 0   Respiratory Symptoms 0   Gastrointestinal Symptoms 0   Genitourinary Symptoms 0   Autonomic Symptoms 0   Behavior at Interview 2   Bella Anxiety Score 12     Pt reporting anxiety to this writer regarding discharge tomorrow. Pt appears anxious and restless. PRN Atarax 25 mg administered at 2108.

## 2024-04-26 NOTE — PLAN OF CARE
Problem: Anxiety  Goal: Anxiety is at manageable level  Description: Interventions:  - Assess and monitor patient's anxiety level.   - Monitor for signs and symptoms (heart palpitations, chest pain, shortness of breath, headaches, nausea, feeling jumpy, restlessness, irritable, apprehensive).   - Collaborate with interdisciplinary team and initiate plan and interventions as ordered.  - [place] patient to unit/surroundings  - Explain treatment plan  - Encourage participation in care  - Encourage verbalization of concerns/fears  - Identify coping mechanisms  - Assist in developing anxiety-reducing skills  - Administer/offer alternative therapies  - Limit or eliminate stimulants  Outcome: Adequate for Discharge     Problem: Risk for Violence/Aggression Toward Others  Goal: Treatment Goal: Refrain from acts of violence/aggression during length of stay, and demonstrate improved impulse control at the time of discharge  Outcome: Adequate for Discharge  Goal: Verbalize thoughts and feelings  Description: Interventions:  - Assess and re-assess patient's level of risk, every waking shift  - Engage patient in 1:1 interactions, daily, for a minimum of 15 minutes   - Allow patient to express feelings and frustrations in a safe and non-threatening manner   - Establish rapport/trust with patient   Outcome: Adequate for Discharge  Goal: Refrain from harming others  Outcome: Adequate for Discharge  Goal: Refrain from destructive acts on the environment or property  Outcome: Adequate for Discharge  Goal: Control angry outbursts  Description: Interventions:  - Monitor patient closely, per order  - Ensure early verbal de-escalation  - Monitor prn medication needs  - Set reasonable/therapeutic limits, outline behavioral expectations, and consequences   - Provide a non-threatening milieu, utilizing the least restrictive interventions   Outcome: Adequate for Discharge  Goal: Attend and participate in unit activities, including  therapeutic, recreational, and educational groups  Description: Interventions:  - Provide therapeutic and educational activities daily, encourage attendance and participation, and document same in the medical record   Outcome: Adequate for Discharge  Goal: Identify appropriate positive anger management techniques  Description: Interventions:  - Offer anger management and coping skills groups   - Staff will provide positive feedback for appropriate anger control  Outcome: Adequate for Discharge     Problem: DISCHARGE PLANNING - CARE MANAGEMENT  Goal: Discharge to post-acute care or home with appropriate resources  Description: INTERVENTIONS:  - Conduct assessment to determine patient/family and health care team treatment goals, and need for post-acute services based on payer coverage, community resources, and patient preferences, and barriers to discharge  - Address psychosocial, clinical, and financial barriers to discharge as identified in assessment in conjunction with the patient/family and health care team  - Arrange appropriate level of post-acute services according to patient’s   needs and preference and payer coverage in collaboration with the physician and health care team  - Communicate with and update the patient/family, physician, and health care team regarding progress on the discharge plan  - Arrange appropriate transportation to post-acute venues  Outcome: Adequate for Discharge     Problem: Potential for Falls  Goal: Patient will remain free of falls  Description: INTERVENTIONS:  - Educate patient/family on patient safety including physical limitations  - Instruct patient to call for assistance with activity   - Consult OT/PT to assist with strengthening/mobility   - Keep Call bell within reach  - Keep bed low and locked with side rails adjusted as appropriate  - Keep care items and personal belongings within reach  - Initiate and maintain comfort rounds  - Make Fall Risk Sign visible to staff  -  Offer Toileting every  Hours, in advance of need  - Initiate/Maintain alarm  - Obtain necessary fall risk management equipment:   - Apply yellow socks and bracelet for high fall risk patients  - Consider moving patient to room near nurses station  Outcome: Adequate for Discharge

## 2024-04-26 NOTE — NURSING NOTE
"Pt appears less anxious on approach. Pt reports that PRN was effective. Pt states \"that stuff really helps, especially with the methadone.\" Pt requesting a prescription for Atarax 25 mg PRN upon discharge tomorrow. Pt encouraged to speak to his provider regarding this.   "

## 2024-07-10 ENCOUNTER — TELEPHONE (OUTPATIENT)
Age: 59
End: 2024-07-10
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
## 2024-08-03 DIAGNOSIS — E55.9 VITAMIN D DEFICIENCY: ICD-10-CM

## 2024-08-04 RX ORDER — ERGOCALCIFEROL 1.25 MG/1
50000 CAPSULE ORAL WEEKLY
Qty: 12 CAPSULE | Refills: 0 | OUTPATIENT
Start: 2024-08-04

## 2024-11-11 ENCOUNTER — HOSPITAL ENCOUNTER (EMERGENCY)
Facility: HOSPITAL | Age: 59
Discharge: HOME | End: 2024-11-12
Attending: EMERGENCY MEDICINE
Payer: COMMERCIAL

## 2024-11-11 ENCOUNTER — APPOINTMENT (EMERGENCY)
Dept: CT IMAGING | Facility: HOSPITAL | Age: 59
End: 2024-11-11
Payer: COMMERCIAL

## 2024-11-11 ENCOUNTER — APPOINTMENT (EMERGENCY)
Dept: RADIOLOGY | Facility: HOSPITAL | Age: 59
End: 2024-11-11
Payer: COMMERCIAL

## 2024-11-11 VITALS
TEMPERATURE: 98 F | SYSTOLIC BLOOD PRESSURE: 118 MMHG | RESPIRATION RATE: 20 BRPM | DIASTOLIC BLOOD PRESSURE: 87 MMHG | HEART RATE: 104 BPM | OXYGEN SATURATION: 97 %

## 2024-11-11 DIAGNOSIS — R06.00 DYSPNEA: ICD-10-CM

## 2024-11-11 DIAGNOSIS — R22.43 LOCALIZED SWELLING OF BOTH LOWER LEGS: Primary | ICD-10-CM

## 2024-11-11 DIAGNOSIS — Z87.09 HISTORY OF COPD: ICD-10-CM

## 2024-11-11 DIAGNOSIS — K76.0 HEPATIC STEATOSIS: ICD-10-CM

## 2024-11-11 DIAGNOSIS — E04.2 MULTIPLE THYROID NODULES: ICD-10-CM

## 2024-11-11 LAB
2HR DELTA HS TROPONIN: -1 NG/L
ALBUMIN SERPL BCG-MCNC: 4.1 G/DL (ref 3.5–5)
ALP SERPL-CCNC: 69 U/L (ref 34–104)
ALT SERPL W P-5'-P-CCNC: 32 U/L (ref 7–52)
ANION GAP SERPL CALCULATED.3IONS-SCNC: 10 MMOL/L (ref 4–13)
AST SERPL W P-5'-P-CCNC: 28 U/L (ref 13–39)
BASOPHILS # BLD AUTO: 0.05 THOUSANDS/ÂΜL (ref 0–0.1)
BASOPHILS NFR BLD AUTO: 1 % (ref 0–1)
BILIRUB SERPL-MCNC: 0.53 MG/DL (ref 0.2–1)
BNP SERPL-MCNC: 25 PG/ML (ref 0–100)
BUN SERPL-MCNC: 11 MG/DL (ref 5–25)
CALCIUM SERPL-MCNC: 9.5 MG/DL (ref 8.4–10.2)
CARDIAC TROPONIN I PNL SERPL HS: 5 NG/L
CARDIAC TROPONIN I PNL SERPL HS: 6 NG/L
CHLORIDE SERPL-SCNC: 104 MMOL/L (ref 96–108)
CO2 SERPL-SCNC: 24 MMOL/L (ref 21–32)
CREAT SERPL-MCNC: 0.87 MG/DL (ref 0.6–1.3)
D DIMER PPP FEU-MCNC: 0.54 UG/ML FEU
EOSINOPHIL # BLD AUTO: 0.21 THOUSAND/ÂΜL (ref 0–0.61)
EOSINOPHIL NFR BLD AUTO: 2 % (ref 0–6)
ERYTHROCYTE [DISTWIDTH] IN BLOOD BY AUTOMATED COUNT: 13.1 % (ref 11.6–15.1)
GFR SERPL CREATININE-BSD FRML MDRD: 95 ML/MIN/1.73SQ M
GLUCOSE SERPL-MCNC: 130 MG/DL (ref 65–140)
HCT VFR BLD AUTO: 39.5 % (ref 36.5–49.3)
HGB BLD-MCNC: 13.6 G/DL (ref 12–17)
IMM GRANULOCYTES # BLD AUTO: 0.07 THOUSAND/UL (ref 0–0.2)
IMM GRANULOCYTES NFR BLD AUTO: 1 % (ref 0–2)
LIPASE SERPL-CCNC: 29 U/L (ref 11–82)
LYMPHOCYTES # BLD AUTO: 2.78 THOUSANDS/ÂΜL (ref 0.6–4.47)
LYMPHOCYTES NFR BLD AUTO: 27 % (ref 14–44)
MCH RBC QN AUTO: 30.9 PG (ref 26.8–34.3)
MCHC RBC AUTO-ENTMCNC: 34.4 G/DL (ref 31.4–37.4)
MCV RBC AUTO: 90 FL (ref 82–98)
MONOCYTES # BLD AUTO: 0.79 THOUSAND/ÂΜL (ref 0.17–1.22)
MONOCYTES NFR BLD AUTO: 8 % (ref 4–12)
NEUTROPHILS # BLD AUTO: 6.53 THOUSANDS/ÂΜL (ref 1.85–7.62)
NEUTS SEG NFR BLD AUTO: 61 % (ref 43–75)
NRBC BLD AUTO-RTO: 0 /100 WBCS
PLATELET # BLD AUTO: 249 THOUSANDS/UL (ref 149–390)
PMV BLD AUTO: 8.5 FL (ref 8.9–12.7)
POTASSIUM SERPL-SCNC: 3.6 MMOL/L (ref 3.5–5.3)
PROT SERPL-MCNC: 7.5 G/DL (ref 6.4–8.4)
RBC # BLD AUTO: 4.4 MILLION/UL (ref 3.88–5.62)
SODIUM SERPL-SCNC: 138 MMOL/L (ref 135–147)
WBC # BLD AUTO: 10.43 THOUSAND/UL (ref 4.31–10.16)

## 2024-11-11 PROCEDURE — 71045 X-RAY EXAM CHEST 1 VIEW: CPT

## 2024-11-11 PROCEDURE — 84484 ASSAY OF TROPONIN QUANT: CPT | Performed by: EMERGENCY MEDICINE

## 2024-11-11 PROCEDURE — 85025 COMPLETE CBC W/AUTO DIFF WBC: CPT | Performed by: EMERGENCY MEDICINE

## 2024-11-11 PROCEDURE — 36415 COLL VENOUS BLD VENIPUNCTURE: CPT

## 2024-11-11 PROCEDURE — 93005 ELECTROCARDIOGRAM TRACING: CPT

## 2024-11-11 PROCEDURE — 96374 THER/PROPH/DIAG INJ IV PUSH: CPT

## 2024-11-11 PROCEDURE — 83880 ASSAY OF NATRIURETIC PEPTIDE: CPT | Performed by: PHYSICIAN ASSISTANT

## 2024-11-11 PROCEDURE — 83690 ASSAY OF LIPASE: CPT | Performed by: PHYSICIAN ASSISTANT

## 2024-11-11 PROCEDURE — 99285 EMERGENCY DEPT VISIT HI MDM: CPT

## 2024-11-11 PROCEDURE — 99284 EMERGENCY DEPT VISIT MOD MDM: CPT | Performed by: PHYSICIAN ASSISTANT

## 2024-11-11 PROCEDURE — 71275 CT ANGIOGRAPHY CHEST: CPT

## 2024-11-11 PROCEDURE — 85379 FIBRIN DEGRADATION QUANT: CPT | Performed by: PHYSICIAN ASSISTANT

## 2024-11-11 PROCEDURE — 80053 COMPREHEN METABOLIC PANEL: CPT | Performed by: EMERGENCY MEDICINE

## 2024-11-11 RX ORDER — FUROSEMIDE 10 MG/ML
20 INJECTION INTRAMUSCULAR; INTRAVENOUS ONCE
Status: COMPLETED | OUTPATIENT
Start: 2024-11-11 | End: 2024-11-11

## 2024-11-11 RX ADMIN — IOHEXOL 85 ML: 350 INJECTION, SOLUTION INTRAVENOUS at 22:25

## 2024-11-11 RX ADMIN — FUROSEMIDE 20 MG: 10 INJECTION, SOLUTION INTRAMUSCULAR; INTRAVENOUS at 22:00

## 2024-11-12 LAB
ATRIAL RATE: 75 BPM
ATRIAL RATE: 84 BPM
P AXIS: 18 DEGREES
PR INTERVAL: 192 MS
QRS AXIS: -71 DEGREES
QRS AXIS: 233 DEGREES
QRSD INTERVAL: 92 MS
QRSD INTERVAL: 98 MS
QT INTERVAL: 370 MS
QT INTERVAL: 396 MS
QTC INTERVAL: 437 MS
QTC INTERVAL: 439 MS
T WAVE AXIS: 140 DEGREES
T WAVE AXIS: 61 DEGREES
VENTRICULAR RATE: 74 BPM
VENTRICULAR RATE: 84 BPM

## 2024-11-12 PROCEDURE — 93010 ELECTROCARDIOGRAM REPORT: CPT | Performed by: INTERNAL MEDICINE

## 2024-11-12 NOTE — ED PROVIDER NOTES
"Time reflects when diagnosis was documented in both MDM as applicable and the Disposition within this note       Time User Action Codes Description Comment    11/12/2024 12:12 AM Sumanth Blevins [E04.2] Multiple thyroid nodules     11/12/2024 12:13 AM Sumanth Blevins Modify [E04.2] Multiple thyroid nodules Stable thyroid nodules particularly in the right thyroid lobe on CT scan    11/12/2024 12:13 AM Sumanth Blevins [K76.0] Hepatic steatosis     11/12/2024 12:13 AM Sumanth Blevins Modify [K76.0] Hepatic steatosis On CT scan    11/12/2024 12:36 AM Sumanth Blevins [R22.43] Localized swelling of both lower legs     11/12/2024 12:43 AM Sumanth Blevins Modify [E04.2] Multiple thyroid nodules Stable thyroid nodules particularly in the right thyroid lobe on CT scan    11/12/2024 12:43 AM Sumanth Blevins [R22.43] Localized swelling of both lower legs     11/12/2024 12:43 AM Sumanth Blevins [R06.00] Dyspnea     11/12/2024 12:43 AM Sumanth Blevins [Z87.09] History of COPD           ED Disposition       ED Disposition   Discharge    Condition   Stable    Date/Time   Tue Nov 12, 2024 12:43 AM    Comment   Mac Wick discharge to home/self care.                   Assessment & Plan       Medical Decision Making  Patient is a 59-year-old male with history of skin cancer, COPD, GERD, surgical history of appendectomy, cervical spine surgery that presents to the emergency department with shortness of breath symptoms over the course of 1 month.  Patient hemodynamically stable and afebrile  Negative serial troponins, delta -1  ECGs x 2 with normal sinus rhythm, no ischemic changes  Negative BNP, +1 pitting edema to tibial plateau bilateral lower extremities; venous stasis bilateral lower extremities  Normal electrolytes, normal kidney function  Leukocytosis of 10.43, no bandemia, doubt infectious etiology  Chest x-ray with no acute cardiopulmonary disease on initial read  CT chest PE study-impression-\"The examination is " "limited due to delayed acquisition of images after contrast administration and also due to streak and motion artifacts , however no definite large emboli in the main pulmonary arteries,evaluation of the segmental and subsegmental branches is difficult....\"Stable thyroid nodules particularly in the right lobe on CT scan  Delivered 20 IV Lasix with patient verbalized decrease in dyspnea symptoms status post medication delivery, suspect an element of volume overload, counseled patient on taking his 20 mg Lasix p.o. daily as instructed by PCP and weighing himself daily on same scale  Negative lipase, doubt acute pancreatitis  Follow-up with PCP  Follow-up with the emergency department should symptoms persist or exacerbate  Patient demonstrates verbal understanding of all clinical laboratory imaging findings, discharge instructions follow-up and verbalized agreement with patient Contreet plan with teach back.    Amount and/or Complexity of Data Reviewed  External Data Reviewed: radiology.     Details: 9/22/2024  \"VAS VENOUS DUPLEX LOWER EXTREMITY BILATERAL COMPLETE  Order: [id number]  Impression    IMPRESSION:  Suboptimal compression images of the distal femoral veins due to edema. No  evidence of bilateral lower extremity femoropopliteal deep venous thrombosis.            Workstation:[id number]  Narrative    CLINICAL HISTORY: Lower extremity edema.    COMMENT: Ultrasound examination of the bilateral lower extremity venous systems  was performed using grayscale, color and spectral Doppler.    COMPARISON: Doppler ultrasound dated 11/16/2022.    FINDINGS:    Suboptimal compression images of the distal femoral veins due to edema. The  common femoral, proximal/mid femoral and popliteal veins demonstrate a normal  response to compression. There is also a normal response to respiratory  variation. Flow is identified in these vessels with no evidence of intraluminal  thrombus on either color Doppler or grayscale images. The calf " "veins where  visualized are free of thrombus.  Exam End: 09/22/24 23:12   Specimen Collected: 09/22/24 23:49 Last Resulted: 09/23/24 00:00  Received From: [facility] \"      Labs: ordered. Decision-making details documented in ED Course.  Radiology: ordered and independent interpretation performed. Decision-making details documented in ED Course.  ECG/medicine tests: ordered and independent interpretation performed. Decision-making details documented in ED Course.    Risk  Prescription drug management.        ED Course as of 11/12/24 0734   Mon Nov 11, 2024 2213 Will order chest PE study   2230 Delta 2hr hsTnI: -1   Tue Nov 12, 2024   0044 Delta 2hr hsTnI: -1       Medications   furosemide (LASIX) injection 20 mg (20 mg Intravenous Given 11/11/24 2200)   iohexol (OMNIPAQUE) 350 MG/ML injection (MULTI-DOSE) 85 mL (85 mL Intravenous Given 11/11/24 2225)       ED Risk Strat Scores   HEART Risk Score      Flowsheet Row Most Recent Value   Heart Score Risk Calculator    History 0 Filed at: 11/12/2024 0038   ECG 0 Filed at: 11/12/2024 0038   Age 1 Filed at: 11/12/2024 0038   Risk Factors 1 Filed at: 11/12/2024 0038   Troponin 0 Filed at: 11/12/2024 0038   HEART Score 2 Filed at: 11/12/2024 0038                           PERC Rule for PE      Flowsheet Row Most Recent Value   PERC Rule for PE    Age >=50 1 Filed at: 11/11/2024 2151   HR >=100 1 Filed at: 11/11/2024 2151   O2 Sat on room air < 95% 0 Filed at: 11/11/2024 2151   History of PE or DVT 0 Filed at: 11/11/2024 2151   Recent trauma or surgery 0 Filed at: 11/11/2024 2151   Hemoptysis 0 Filed at: 11/11/2024 2151   Exogenous estrogen 0 Filed at: 11/11/2024 2151   Unilateral leg swelling 0 Filed at: 11/11/2024 2151   PERC Rule for PE Results 2 Filed at: 11/11/2024 2151                Wells' Criteria for PE      Flowsheet Row Most Recent Value   Wells' Criteria for PE    Clinical signs and symptoms of DVT 0 Filed at: 11/11/2024 3460   PE is primary " "diagnosis or equally likely 3 Filed at: 11/11/2024 2150   HR >100 --   Immobilization at least 3 days or Surgery in the previous 4 weeks 1.5 Filed at: 11/11/2024 2150   Previous, objectively diagnosed PE or DVT 0 Filed at: 11/11/2024 2150   Hemoptysis 0 Filed at: 11/11/2024 2150   Malignancy with treatment within 6 months or palliative 0 Filed at: 11/11/2024 2150   Wells' Criteria Total 4.5 Filed at: 11/11/2024 2150                        History of Present Illness       Chief Complaint   Patient presents with    Shortness of Breath     Reports worsening SOB last month with chest pressure, reports bilateral lower edema.  Pt reports has not been able to take care of health due to ongoing stressors.    Chest Pain     Patient is a 59-year-old male with history of skin cancer, COPD, GERD, surgical history of appendectomy, cervical spine surgery that presents to the emergency department with shortness of breath symptoms over the course of 1 month.  Patient denies associated symptoms.  Patient also states that he has not been to a physician \"for a while.\"  Patient also states that he has not taken his Lasix 20 mg daily for bilateral lower extremity swelling for the last for 5 days and reports a couple pound weight gain within that time; H&H normal, WBC 9.2, D-dimer 0.42, normal lipase, rapid influenza AB RSV and SARS COVID negative BNP 14, chest x-ray 2 views with no acute pulmonary abnormality, \"VAS duplex ultrasound bilateral with suboptimal compression images of the distal femoral veins due to edema.  No evidence of bilateral lower extremity femoral-popliteal deep venous thrombosis\".  Patient denies history of DVT and PE.  Patient denies cough or other URI symptoms.  Patient denies palliative factors with provocative factors of lying flat.  Patient denies noneffective treatment.  Patient with recent visit and evaluation of lower extremity edema on 9/22/2024; reporting that symptoms have been going on for \"5 or more " years patient denies fevers, chills, nausea, vomiting, diarrhea, constipation and urinary symptoms.  Patient denies recent fall and recent trauma.  Patient denies sick contacts recent travel.  Patient denies abdominal pain.    Past Medical History:   Diagnosis Date    Anxiety     Arthritis     Cancer (HCC) 2017    skin cancer    Closed T2 fracture (HCC) 5/4/2022    COPD (chronic obstructive pulmonary disease) (HCC)     Depression     Diverticulitis of colon     Establishing care with new doctor, encounter for 8/11/2022    GERD (gastroesophageal reflux disease)     Infectious viral hepatitis     Pneumonia     Shingles       Past Surgical History:   Procedure Laterality Date    APPENDECTOMY      HIP SURGERY Left 2015    KNEE SURGERY Left 2020    orthoscopic    SPINE SURGERY  06/13/2022      Family History   Problem Relation Age of Onset    Cancer Mother     Depression Mother     Hyperlipidemia Mother     Thyroid disease Mother     Cancer Father     COPD Father     Hypertension Father     Post-traumatic stress disorder Father     Diabetes Sister     Hepatitis Sister     Cancer Sister     Hepatitis Sister     Mental illness Sister       Social History     Tobacco Use    Smoking status: Every Day     Current packs/day: 0.50     Average packs/day: 0.5 packs/day for 46.9 years (23.4 ttl pk-yrs)     Types: Cigarettes, Pipe     Start date: 1978    Smokeless tobacco: Former     Types: Chew   Vaping Use    Vaping status: Never Used   Substance Use Topics    Alcohol use: Yes     Comment: a few days ago    Drug use: Yes     Types: Marijuana, Methamphetamines     Comment: methadone      E-Cigarette/Vaping    E-Cigarette Use Never User       E-Cigarette/Vaping Substances    Nicotine No     THC Yes     CBD Yes     Flavoring No     Other No     Unknown No       I have reviewed and agree with the history as documented.       History provided by:  Patient   used: No    Shortness of Breath  Associated symptoms:  chest pain    Chest Pain  Associated symptoms: shortness of breath        Review of Systems   Respiratory:  Positive for shortness of breath.    Cardiovascular:  Positive for chest pain.           Objective       ED Triage Vitals [11/11/24 1927]   Temperature Pulse Blood Pressure Respirations SpO2 Patient Position - Orthostatic VS   98 °F (36.7 °C) 104 118/87 20 97 % --      Temp Source Heart Rate Source BP Location FiO2 (%) Pain Score    Oral Monitor -- -- --      Vitals      Date and Time Temp Pulse SpO2 Resp BP Pain Score FACES Pain Rating User   11/11/24 1927 98 °F (36.7 °C) 104 97 % 20 118/87 -- -- SH            Physical Exam    Results Reviewed       Procedure Component Value Units Date/Time    B-Type Natriuretic Peptide(BNP) [[id number]]  (Normal) Collected: 11/11/24 1932    Lab Status: Final result Specimen: Blood from Arm, Right Updated: 11/11/24 2230     BNP 25 pg/mL     HS Troponin I 2hr [[id number]]  (Normal) Collected: 11/11/24 2158    Lab Status: Final result Specimen: Blood from Arm, Right Updated: 11/11/24 2227     hs TnI 2hr 5 ng/L      Delta 2hr hsTnI -1 ng/L     Lipase [[id number]]  (Normal) Collected: 11/11/24 1932    Lab Status: Final result Specimen: Blood from Arm, Right Updated: 11/11/24 2216     Lipase 29 u/L     D-Dimer [[id number]]  (Abnormal) Collected: 11/11/24 1932    Lab Status: Final result Specimen: Blood from Arm, Right Updated: 11/11/24 2210     D-Dimer, Quant 0.54 ug/ml FEU     Narrative:      In the evaluation for possible pulmonary embolism, in the appropriate (Well's Score of 4 or less) patient, the age adjusted d-dimer cutoff for this patient can be calculated as:    Age x 0.01 (in ug/mL) for Age-adjusted D-dimer exclusion threshold for a patient over 50 years.    HS Troponin 0hr (reflex protocol) [[id number]]  (Normal) Collected: 11/11/24 1932    Lab Status: Final result Specimen: Blood from Arm, Right Updated: 11/11/24 2018     hs TnI 0hr 6 ng/L     Comprehensive metabolic panel  [[id number]] Collected: 11/11/24 1932    Lab Status: Final result Specimen: Blood from Arm, Right Updated: 11/11/24 2016     Sodium 138 mmol/L      Potassium 3.6 mmol/L      Chloride 104 mmol/L      CO2 24 mmol/L      ANION GAP 10 mmol/L      BUN 11 mg/dL      Creatinine 0.87 mg/dL      Glucose 130 mg/dL      Calcium 9.5 mg/dL      AST 28 U/L      ALT 32 U/L      Alkaline Phosphatase 69 U/L      Total Protein 7.5 g/dL      Albumin 4.1 g/dL      Total Bilirubin 0.53 mg/dL      eGFR 95 ml/min/1.73sq m     Narrative:      National Kidney Disease Foundation guidelines for Chronic Kidney Disease (CKD):     Stage 1 with normal or high GFR (GFR > 90 mL/min/1.73 square meters)    Stage 2 Mild CKD (GFR = 60-89 mL/min/1.73 square meters)    Stage 3A Moderate CKD (GFR = 45-59 mL/min/1.73 square meters)    Stage 3B Moderate CKD (GFR = 30-44 mL/min/1.73 square meters)    Stage 4 Severe CKD (GFR = 15-29 mL/min/1.73 square meters)    Stage 5 End Stage CKD (GFR <15 mL/min/1.73 square meters)  Note: GFR calculation is accurate only with a steady state creatinine    CBC and differential [[id number]]  (Abnormal) Collected: 11/11/24 1932    Lab Status: Final result Specimen: Blood from Arm, Right Updated: 11/11/24 1948     WBC 10.43 Thousand/uL      RBC 4.40 Million/uL      Hemoglobin 13.6 g/dL      Hematocrit 39.5 %      MCV 90 fL      MCH 30.9 pg      MCHC 34.4 g/dL      RDW 13.1 %      MPV 8.5 fL      Platelets 249 Thousands/uL      nRBC 0 /100 WBCs      Segmented % 61 %      Immature Grans % 1 %      Lymphocytes % 27 %      Monocytes % 8 %      Eosinophils Relative 2 %      Basophils Relative 1 %      Absolute Neutrophils 6.53 Thousands/µL      Absolute Immature Grans 0.07 Thousand/uL      Absolute Lymphocytes 2.78 Thousands/µL      Absolute Monocytes 0.79 Thousand/µL      Eosinophils Absolute 0.21 Thousand/µL      Basophils Absolute 0.05 Thousands/µL             CTA chest pe study   ED Interpretation by Sumanth Blevins PA-C (11/12  0011)   [facility]   Preliminary Radiology Report  Patient Name: PIERO CHIU MRN: [id number]   (Age): 1965 (59) Gender: M  Date of Exam: 2024 Accession: [id number]  Referring Physician: SYBIL SMITH # of Images: [id number]  Ordered As: CTA CHEST   Support  [phone]  access[employer]  Page 1 of 2  PROCEDURE INFORMATION:  Exam: CTA Chest With Contrast  Exam date and time: 2024 10:24 PM  Age: 59 years old  Clinical indication: Pain; Chest pressure; Additional info: Worsening SOB with chest pressure and  bilat lower edema  TECHNIQUE:  Imaging protocol: Computed tomographic angiography of the chest with contrast. Exam focused on  the arteries.  3D rendering (Not supervised by radiologist): MIP and/or 3D reconstructed images were created  by the technologist.  Contrast material: OMNI 350; Contrast volume: 85 ml; Contrast route: INTRAVENOUS (IV);  COMPARISON:  CT LUNG SCREENING PROGRAM 2022 3:32 PM  FINDINGS:  Pulmonary arteries: The exami   nation is limited due to delayed acquisition of images after contrast  administration and also due to streak and motion artifacts , however no definite large emboli in the  main pulmonary arteries, evaluation of the segmental and subsegmental branches is difficult.  Aorta: Unremarkable. No aortic aneurysm. No aortic dissection.  Lungs: Atelectatic bands are noted in both lung bases, no other pulmonary consolidation.  Pleural spaces: Unremarkable. No pneumothorax. No pleural effusion.  Heart: Stable cardiomegaly. No pericardial effusion.  Lymph nodes: Unremarkable. No enlarged lymph nodes.  Bones/joints: Postoperative changes with previous posterior fusion is noted in the upper thoracic  spine. No acute fracture.  Soft tissues: Stable thyroid nodules particularly in the right thyroid lobe. There is diffuse fatty  infiltration of the liver  IMPRESSION:  The examination is limited due to delayed acquisition of images after  contrast administration and also  due to streak and motion artifa   cts , however no definite large emboli in the main pulmonary arteries,  evaluation of the segmental and subsegmental branches is difficult.  Dictated and Authenticated by: Naina Valdes MD  CHIUPIERO WATSON Accession: [id number] MRN: [id number]  Preliminary Radiology Report   (QA) DISCREPANCY?  If there is a discrepancy between the preliminary and final interpretation, please notify [facility] via [email or web address].  If you do not have access to our QA portal, call our QA team at [phone]  CONFIDENTIALITY STATEMENT  This report is intended only for the use of the referring physician, and only in accordance with law, If you received this in error, call [phone]  Page 2 of 2  11/11/2024 11:19 PM Eastern Time ([place] & [place])      Final Interpretation by Asaf Kruse MD (11/12 0730)      Limited study, no pulmonary embolus identified.      Hepatic steatosis.            Workstation performed: [id number]         XR chest 1 view portable   Final Interpretation by Dave Miller MD (11/11 9878)      Bibasilar atelectasis more evident on the concurrent CT performed after. Otherwise no focal airspace consolidation or significant effusion.            Workstation performed: [id number]             ECG 12 Lead Documentation Only    Date/Time: 11/11/2024 9:28 PM    Performed by: Sumanth Blevins PA-C  Authorized by: Sumanth Blevins PA-C    Indications / Diagnosis:  Chest pain  ECG reviewed by me, the ED Provider: yes    Patient location:  ED  Previous ECG:     Previous ECG:  Compared to current    Comparison ECG info:  When compared to ECG on April 19, 2024, no significant changes are noted.    Similarity:  No change    Comparison to cardiac monitor: Yes    Interpretation:     Interpretation: normal    Rate:     ECG rate:  84    ECG rate assessment: normal    Rhythm:     Rhythm: sinus rhythm    Ectopy:     Ectopy: none    QRS:      QRS axis:  Normal    QRS intervals:  Normal  Conduction:     Conduction: normal    ST segments:     ST segments:  Normal  T waves:     T waves: normal    ECG 12 Lead Documentation Only    Date/Time: 11/11/2024 10:41 PM    Performed by: Sumanth Blevins PA-C  Authorized by: Suamnth Blevins PA-C    Indications / Diagnosis:  Chest pain and shortness of breath  ECG reviewed by me, the ED Provider: yes    Patient location:  ED  Previous ECG:     Previous ECG:  Compared to current    Comparison ECG info:  When compared with ECG November 11, 2024 1928 no significant changes were noted.    Similarity:  No change  Interpretation:     Interpretation: normal    Rate:     ECG rate:  74    ECG rate assessment: normal    Rhythm:     Rhythm: sinus rhythm    Ectopy:     Ectopy: none    QRS:     QRS axis:  Normal    QRS intervals:  Normal  Conduction:     Conduction: normal    ST segments:     ST segments:  Normal  T waves:     T waves: normal        ED Medication and Procedure Management   Prior to Admission Medications   Prescriptions Last Dose Informant Patient Reported? Taking?   ARIPiprazole (ABILIFY) 10 mg tablet   No No   Sig: Take 1 tablet (10 mg total) by mouth daily   cyanocobalamin (VITAMIN B-12) 1000 MCG tablet   No No   Sig: Take 1 tablet (1,000 mcg total) by mouth daily   ergocalciferol (VITAMIN D2) 50,000 units  Self, Pharmacy (Specify) No No   Sig: Take 1 capsule (50,000 Units total) by mouth once a week   folic acid (FOLVITE) 1 mg tablet   No No   Sig: Take 1 tablet (1 mg total) by mouth daily   furosemide (LASIX) 20 mg tablet  Self, Pharmacy (Specify) No No   Sig: Take 1 tablet (20 mg total) by mouth daily   gabapentin (NEURONTIN) 300 mg capsule   No No   Sig: Take 1 capsule (300 mg total) by mouth 3 (three) times a day   nicotine (NICODERM CQ) 21 mg/24 hr TD 24 hr patch   No No   Sig: Place 1 patch on the skin over 24 hours daily   omeprazole (PriLOSEC) 20 mg delayed release capsule  Self, Pharmacy (Specify) No No    Sig: Take 1 capsule (20 mg total) by mouth in the morning      Facility-Administered Medications: None     Discharge Medication List as of 11/12/2024 12:44 AM        CONTINUE these medications which have NOT CHANGED    Details   ARIPiprazole (ABILIFY) 10 mg tablet Take 1 tablet (10 mg total) by mouth daily, Starting Thu 4/25/2024, Until Sat 5/25/2024, Normal      cyanocobalamin (VITAMIN B-12) 1000 MCG tablet Take 1 tablet (1,000 mcg total) by mouth daily, Starting Thu 4/25/2024, Until Sat 5/25/2024, Normal      ergocalciferol (VITAMIN D2) 50,000 units Take 1 capsule (50,000 Units total) by mouth once a week, Starting Mon 1/15/2024, Normal      folic acid (FOLVITE) 1 mg tablet Take 1 tablet (1 mg total) by mouth daily, Starting Fri 4/26/2024, Until Sun 5/26/2024, Normal      furosemide (LASIX) 20 mg tablet Take 1 tablet (20 mg total) by mouth daily, Starting Mon 1/15/2024, Normal      gabapentin (NEURONTIN) 300 mg capsule Take 1 capsule (300 mg total) by mouth 3 (three) times a day, Starting Thu 4/25/2024, Until Sat 5/25/2024, Normal      nicotine (NICODERM CQ) 21 mg/24 hr TD 24 hr patch Place 1 patch on the skin over 24 hours daily, Starting Fri 4/26/2024, Normal      omeprazole (PriLOSEC) 20 mg delayed release capsule Take 1 capsule (20 mg total) by mouth in the morning, Starting Mon 1/15/2024, Normal           No discharge procedures on file.  ED SEPSIS DOCUMENTATION   Time reflects when diagnosis was documented in both MDM as applicable and the Disposition within this note       Time User Action Codes Description Comment    11/12/2024 12:12 AM Sumanth Blevins [E04.2] Multiple thyroid nodules     11/12/2024 12:13 AM Sumanth Blevins Modify [E04.2] Multiple thyroid nodules Stable thyroid nodules particularly in the right thyroid lobe on CT scan    11/12/2024 12:13 AM Sumanth Blevins [K76.0] Hepatic steatosis     11/12/2024 12:13 AM Sumanth Blevins Modify [K76.0] Hepatic steatosis On CT scan    11/12/2024 12:36 AM  Gen, Sumanth Add [R22.43] Localized swelling of both lower legs     11/12/2024 12:43 AM Sumanth Blevins [E04.2] Multiple thyroid nodules Stable thyroid nodules particularly in the right thyroid lobe on CT scan    11/12/2024 12:43 AM Sumanth Blevins [R22.43] Localized swelling of both lower legs     11/12/2024 12:43 AM Sumanth Blevins [R06.00] Dyspnea     11/12/2024 12:43 AM Sumanth Blevins [Z87.09] History of COPD                  Sumanth Blevins PA-C  11/12/24 0735

## 2024-11-12 NOTE — DISCHARGE INSTRUCTIONS
[facility]   Preliminary Radiology Report  Patient Name: PIERO CHIU MRN: [id number]   (Age): 1965 (59) Gender: M  Date of Exam: 2024 Accession: [id number]  Referring Physician: SYBIL SMITH # of Images: [id number]  Ordered As: CTA CHEST   Support  [phone]  access[facility]  Page 1 of 2  PROCEDURE INFORMATION:  Exam: CTA Chest With Contrast  Exam date and time: 2024 10:24 PM  Age: 59 years old  Clinical indication: Pain; Chest pressure; Additional info: Worsening SOB with chest pressure and  bilat lower edema  TECHNIQUE:  Imaging protocol: Computed tomographic angiography of the chest with contrast. Exam focused on  the arteries.  3D rendering (Not supervised by radiologist): MIP and/or 3D reconstructed images were created  by the technologist.  Contrast material: OMNI 350; Contrast volume: 85 ml; Contrast route: INTRAVENOUS (IV);  COMPARISON:  CT LUNG SCREENING PROGRAM 2022 3:32 PM  FINDINGS:  Pulmonary arteries: The examination is limited due to delayed acquisition of images after contrast  administration and also due to streak and motion artifacts , however no definite large emboli in the  main pulmonary arteries, evaluation of the segmental and subsegmental branches is difficult.  Aorta: Unremarkable. No aortic aneurysm. No aortic dissection.  Lungs: Atelectatic bands are noted in both lung bases, no other pulmonary consolidation.  Pleural spaces: Unremarkable. No pneumothorax. No pleural effusion.  Heart: Stable cardiomegaly. No pericardial effusion.  Lymph nodes: Unremarkable. No enlarged lymph nodes.  Bones/joints: Postoperative changes with previous posterior fusion is noted in the upper thoracic  spine. No acute fracture.  Soft tissues: Stable thyroid nodules particularly in the right thyroid lobe. There is diffuse fatty  infiltration of the liver  IMPRESSION:  The examination is limited due to delayed acquisition of images after contrast  administration and also due to streak and motion artifacts , however no definite large emboli in the main pulmonary arteries,evaluation of the segmental and subsegmental branches is difficult.  Dictated and Authenticated by: Naina Valdes MD MCINTOSH, ANTHONY Accession: [id number] MRN: [id number]  Preliminary Radiology Report   (QA) DISCREPANCY?  If there is a discrepancy between the preliminary and final interpretation, please notify [facility] via [email or web address].  If you do not have access to our QA portal, call our QA team at [phone]  CONFIDENTIALITY STATEMENT  This report is intended only for the use of the referring physician, and only in accordance with law, If you received this in error, call [phone]  Page 2 of 2  11/11/2024 11:19 PM Eastern Time ([place] & [place])    Take Lasix as directed, weigh each other daily

## 2025-02-17 ENCOUNTER — TELEPHONE (OUTPATIENT)
Dept: INTERNAL MEDICINE CLINIC | Facility: OTHER | Age: 60
End: 2025-02-17

## 2025-03-12 ENCOUNTER — TELEPHONE (OUTPATIENT)
Age: 60
End: 2025-03-12

## 2025-03-12 NOTE — TELEPHONE ENCOUNTER
Left message on machine to call back to schedule overdue Medicare Wellness Visit & follow-up. Last seen in Jan 2024

## 2025-03-24 NOTE — TELEPHONE ENCOUNTER
Pt's mom calling in because she received a call from the office to confirm pt's member ID # for Medicare; she stated she gave him the envelope with all the information and she didn't have anything else but will keep looking. I advised her to try and call Medicare to see if they can provide that information but she stated she's tried that before and they won't speak with her. Please advise.